# Patient Record
Sex: MALE | Race: WHITE | Employment: FULL TIME | URBAN - METROPOLITAN AREA
[De-identification: names, ages, dates, MRNs, and addresses within clinical notes are randomized per-mention and may not be internally consistent; named-entity substitution may affect disease eponyms.]

---

## 2017-09-19 LAB
ANION GAP SERPL CALCULATED.3IONS-SCNC: 6 MMOL/L (ref 4–12)
BUN BLDV-MCNC: 17 MG/DL (ref 7–20)
CALCIUM SERPL-MCNC: 8.7 MG/DL (ref 8.8–10.5)
CHLORIDE BLD-SCNC: 96 MEQ/L (ref 101–111)
CO2: 28 MEQ/L (ref 21–32)
CREAT SERPL-MCNC: 1.1 MG/DL (ref 0.7–1.3)
CREATININE CLEARANCE: >60
CREATININE, RANDOM URINE: 62.8 MG/DL
GLUCOSE: 136 MG/DL (ref 70–110)
PARATHYROID HORMONE INTACT: 30 U/ML (ref 12–88)
PHOSPHORUS: 3 MG/DL (ref 2.4–4.7)
POTASSIUM SERPL-SCNC: 4.4 MEQ/L (ref 3.6–5)
PROTEIN, URINE, RANDOM: < 6 MG/DL
PROTEIN/CREAT RATIO: < 0.1 G/1.73M2
SODIUM BLD-SCNC: 130 MEQ/L (ref 135–145)
VITAMIN D 25-HYDROXY: 41.14 NG/ML (ref 30–100)

## 2017-09-26 ENCOUNTER — OFFICE VISIT (OUTPATIENT)
Dept: NEPHROLOGY | Age: 67
End: 2017-09-26
Payer: COMMERCIAL

## 2017-09-26 VITALS
DIASTOLIC BLOOD PRESSURE: 84 MMHG | WEIGHT: 298.6 LBS | OXYGEN SATURATION: 97 % | HEART RATE: 70 BPM | SYSTOLIC BLOOD PRESSURE: 130 MMHG

## 2017-09-26 DIAGNOSIS — M54.5 CHRONIC MIDLINE LOW BACK PAIN, WITH SCIATICA PRESENCE UNSPECIFIED: ICD-10-CM

## 2017-09-26 DIAGNOSIS — N18.30 CKD (CHRONIC KIDNEY DISEASE) STAGE 3, GFR 30-59 ML/MIN (HCC): Primary | ICD-10-CM

## 2017-09-26 DIAGNOSIS — N18.3 TYPE 2 DIABETES MELLITUS WITH STAGE 3 CHRONIC KIDNEY DISEASE, UNSPECIFIED LONG TERM INSULIN USE STATUS: ICD-10-CM

## 2017-09-26 DIAGNOSIS — G89.29 CHRONIC MIDLINE LOW BACK PAIN, WITH SCIATICA PRESENCE UNSPECIFIED: ICD-10-CM

## 2017-09-26 DIAGNOSIS — R80.9 MICROALBUMINURIA: ICD-10-CM

## 2017-09-26 DIAGNOSIS — I10 ESSENTIAL HYPERTENSION, BENIGN: ICD-10-CM

## 2017-09-26 DIAGNOSIS — E11.22 TYPE 2 DIABETES MELLITUS WITH STAGE 3 CHRONIC KIDNEY DISEASE, UNSPECIFIED LONG TERM INSULIN USE STATUS: ICD-10-CM

## 2017-09-26 PROCEDURE — 99213 OFFICE O/P EST LOW 20 MIN: CPT | Performed by: INTERNAL MEDICINE

## 2017-09-26 RX ORDER — PIOGLITAZONEHYDROCHLORIDE 15 MG/1
15 TABLET ORAL DAILY
COMMUNITY
Start: 2017-06-28

## 2017-09-26 RX ORDER — AMLODIPINE BESYLATE 2.5 MG/1
2.5 TABLET ORAL DAILY
COMMUNITY
Start: 2017-06-28

## 2018-09-18 LAB
ANION GAP SERPL CALCULATED.3IONS-SCNC: 9 MMOL/L (ref 4–12)
BUN BLDV-MCNC: 18 MG/DL (ref 7–20)
CALCIUM SERPL-MCNC: 8.8 MG/DL (ref 8.8–10.5)
CHLORIDE BLD-SCNC: 98 MEQ/L (ref 101–111)
CO2: 28 MEQ/L (ref 21–32)
CREAT SERPL-MCNC: 1.09 MG/DL (ref 0.7–1.3)
CREATININE CLEARANCE: >60
CREATININE, RANDOM URINE: 91.2 MG/DL
GLUCOSE, FASTING: 201 MG/DL (ref 70–110)
MICROALBUMIN UR-MCNC: 9.2 MG/L
MICROALBUMIN/CREAT UR-RTO: 10.1 MG/GM (ref 0–30)
POTASSIUM SERPL-SCNC: 4.7 MEQ/L (ref 3.6–5)
PROTEIN, URINE, RANDOM: 6 MG/DL
PROTEIN/CREAT RATIO: 0.1 G/1.73M2
SODIUM BLD-SCNC: 135 MEQ/L (ref 135–145)

## 2018-09-25 ENCOUNTER — OFFICE VISIT (OUTPATIENT)
Dept: NEPHROLOGY | Age: 68
End: 2018-09-25
Payer: MEDICARE

## 2018-09-25 VITALS — WEIGHT: 291 LBS | HEART RATE: 68 BPM | SYSTOLIC BLOOD PRESSURE: 110 MMHG | DIASTOLIC BLOOD PRESSURE: 60 MMHG

## 2018-09-25 DIAGNOSIS — I10 ESSENTIAL HYPERTENSION, BENIGN: ICD-10-CM

## 2018-09-25 DIAGNOSIS — E11.22 TYPE 2 DIABETES MELLITUS WITH STAGE 2 CHRONIC KIDNEY DISEASE, UNSPECIFIED WHETHER LONG TERM INSULIN USE (HCC): ICD-10-CM

## 2018-09-25 DIAGNOSIS — N18.2 CKD (CHRONIC KIDNEY DISEASE), STAGE II: Primary | ICD-10-CM

## 2018-09-25 DIAGNOSIS — N18.2 TYPE 2 DIABETES MELLITUS WITH STAGE 2 CHRONIC KIDNEY DISEASE, UNSPECIFIED WHETHER LONG TERM INSULIN USE (HCC): ICD-10-CM

## 2018-09-25 PROBLEM — E11.9 TYPE 2 DIABETES MELLITUS (HCC): Status: ACTIVE | Noted: 2018-09-25

## 2018-09-25 PROCEDURE — 99213 OFFICE O/P EST LOW 20 MIN: CPT | Performed by: INTERNAL MEDICINE

## 2018-09-25 NOTE — PROGRESS NOTES
Renal Progress Note    Assessment and Plan:      Diagnosis Orders   1. CKD (chronic kidney disease), stage II     2. Essential hypertension, benign     3. Type 2 diabetes mellitus with stage 2 chronic kidney disease, unspecified whether long term insulin use (HCC)       PLAN:  Reviewed labs with the patient   Serum creatinine is 1.09 mg/dl and was 1.1 mg/dl 12 months ago  Medications reviewed   Refills provided for tramadol 270 tablets 50 mg TID as needed for pain  Return visit in 12 months     Patient Active Problem List   Diagnosis    Microalbuminuria    Type II or unspecified type diabetes mellitus without mention of complication, not stated as uncontrolled    Essential hypertension, benign    Edema    Proteinuria    CKD (chronic kidney disease) stage 3, GFR 30-59 ml/min    Back pain, chronic    Type 2 diabetes mellitus with diabetic chronic kidney disease (HCC)    CKD (chronic kidney disease), stage II    Type [facility] ([facility]       Subjective:   Chief complaint:  Chief Complaint   Patient presents with    1 Year Follow Up    Chronic Kidney Disease     Stage 3      HPI:This is a follow up visit for Mr. Wilfredo Langston who is here today for return appointment. I see him for chronic kidney disease. He was last seen about 12 months ago. Serum creatinine was 1.1 mg per deciliter. Today it is 1.09 mg/dL. Blood pressure is good. Still has chronic back pain  Well with no complaints.     ROS:Constitutional: negative  Eyes: negative  Ears, nose, mouth, throat, and face: negative  Respiratory: negative  Cardiovascular: negative  Gastrointestinal: negative  Genitourinary:negative  Integument/breast: negative  Hematologic/lymphatic: negative  Musculoskeletal:positive for arthralgias, back pain and stiff joints  Neurological: negative  Behavioral/Psych: negative  Endocrine: negative  Allergic/Immunologic: negative     Medications:     Current Outpatient Prescriptions   Medication Sig Dispense Refill   

## 2019-10-01 LAB
ANION GAP SERPL CALCULATED.3IONS-SCNC: 6 MMOL/L (ref 4–12)
BUN BLDV-MCNC: 15 MG/DL (ref 7–20)
CALCIUM SERPL-MCNC: 9.2 MG/DL (ref 8.8–10.5)
CHLORIDE BLD-SCNC: 102 MEQ/L (ref 101–111)
CO2: 29 MEQ/L (ref 21–32)
CREAT SERPL-MCNC: 1.21 MG/DL (ref 0.6–1.3)
CREATININE CLEARANCE: 60
GLUCOSE: 114 MG/DL (ref 70–110)
POTASSIUM SERPL-SCNC: 5 MEQ/L (ref 3.6–5)
SODIUM BLD-SCNC: 137 MEQ/L (ref 135–145)

## 2019-10-08 ENCOUNTER — OFFICE VISIT (OUTPATIENT)
Dept: NEPHROLOGY | Age: 69
End: 2019-10-08
Payer: MEDICARE

## 2019-10-08 VITALS
WEIGHT: 290 LBS | OXYGEN SATURATION: 95 % | SYSTOLIC BLOOD PRESSURE: 130 MMHG | HEART RATE: 59 BPM | DIASTOLIC BLOOD PRESSURE: 79 MMHG

## 2019-10-08 DIAGNOSIS — N18.2 CKD (CHRONIC KIDNEY DISEASE), STAGE II: Primary | ICD-10-CM

## 2019-10-08 DIAGNOSIS — I10 ESSENTIAL HYPERTENSION, BENIGN: ICD-10-CM

## 2019-10-08 DIAGNOSIS — G89.29 OTHER CHRONIC PAIN: ICD-10-CM

## 2019-10-08 DIAGNOSIS — E11.22 TYPE 2 DIABETES MELLITUS WITH STAGE 2 CHRONIC KIDNEY DISEASE, UNSPECIFIED WHETHER LONG TERM INSULIN USE (HCC): ICD-10-CM

## 2019-10-08 DIAGNOSIS — N18.2 TYPE 2 DIABETES MELLITUS WITH STAGE 2 CHRONIC KIDNEY DISEASE, UNSPECIFIED WHETHER LONG TERM INSULIN USE (HCC): ICD-10-CM

## 2019-10-08 DIAGNOSIS — R80.9 MICROALBUMINURIA: ICD-10-CM

## 2019-10-08 PROCEDURE — 99214 OFFICE O/P EST MOD 30 MIN: CPT | Performed by: INTERNAL MEDICINE

## 2019-10-08 PROCEDURE — 3046F HEMOGLOBIN A1C LEVEL >9.0%: CPT | Performed by: INTERNAL MEDICINE

## 2019-10-08 PROCEDURE — 3017F COLORECTAL CA SCREEN DOC REV: CPT | Performed by: INTERNAL MEDICINE

## 2019-10-08 PROCEDURE — 1036F TOBACCO NON-USER: CPT | Performed by: INTERNAL MEDICINE

## 2019-10-08 PROCEDURE — 1123F ACP DISCUSS/DSCN MKR DOCD: CPT | Performed by: INTERNAL MEDICINE

## 2019-10-08 PROCEDURE — G8427 DOCREV CUR MEDS BY ELIG CLIN: HCPCS | Performed by: INTERNAL MEDICINE

## 2019-10-08 PROCEDURE — G8421 BMI NOT CALCULATED: HCPCS | Performed by: INTERNAL MEDICINE

## 2019-10-08 PROCEDURE — 2022F DILAT RTA XM EVC RTNOPTHY: CPT | Performed by: INTERNAL MEDICINE

## 2019-10-08 PROCEDURE — G8482 FLU IMMUNIZE ORDER/ADMIN: HCPCS | Performed by: INTERNAL MEDICINE

## 2019-10-08 PROCEDURE — 4040F PNEUMOC VAC/ADMIN/RCVD: CPT | Performed by: INTERNAL MEDICINE

## 2019-10-08 RX ORDER — GLIMEPIRIDE 4 MG/1
2 TABLET ORAL
COMMUNITY
Start: 2019-09-27 | End: 2021-10-05

## 2019-10-08 RX ORDER — TRAZODONE HYDROCHLORIDE 150 MG/1
150 TABLET ORAL NIGHTLY
COMMUNITY

## 2019-10-08 RX ORDER — TRAMADOL HYDROCHLORIDE 50 MG/1
50 TABLET ORAL 3 TIMES DAILY PRN
Qty: 270 TABLET | Refills: 3 | Status: SHIPPED | OUTPATIENT
Start: 2019-10-08 | End: 2022-10-04

## 2019-10-08 RX ORDER — IPRATROPIUM BROMIDE 21 UG/1
SPRAY, METERED NASAL
COMMUNITY
Start: 2019-08-14

## 2019-10-22 DIAGNOSIS — M54.40 CHRONIC LOW BACK PAIN WITH SCIATICA, SCIATICA LATERALITY UNSPECIFIED, UNSPECIFIED BACK PAIN LATERALITY: Primary | ICD-10-CM

## 2019-10-22 DIAGNOSIS — G89.29 CHRONIC LOW BACK PAIN WITH SCIATICA, SCIATICA LATERALITY UNSPECIFIED, UNSPECIFIED BACK PAIN LATERALITY: Primary | ICD-10-CM

## 2019-10-22 RX ORDER — TRAMADOL HYDROCHLORIDE 50 MG/1
TABLET ORAL
Qty: 270 TABLET | Refills: 3 | Status: SHIPPED | OUTPATIENT
Start: 2019-10-22 | End: 2022-10-04 | Stop reason: SDUPTHER

## 2020-10-02 LAB
ANION GAP SERPL CALCULATED.3IONS-SCNC: 7 MMOL/L (ref 5–15)
BUN BLDV-MCNC: 15 MG/DL (ref 5–27)
CALCIUM SERPL-MCNC: 9.1 MG/DL (ref 8.5–10.5)
CHLORIDE BLD-SCNC: 103 MMOL/L (ref 98–109)
CO2: 30 MMOL/L (ref 22–32)
CREAT SERPL-MCNC: 1.38 MG/DL (ref 0.6–1.3)
EGFR AFRICAN AMERICAN: >60 ML/MIN/1.73SQ.M
EGFR IF NONAFRICAN AMERICAN: 51 ML/MIN/1.73SQ.M
GLUCOSE: 120 MG/DL (ref 65–99)
POTASSIUM SERPL-SCNC: 4.8 MMOL/L (ref 3.5–5)
SODIUM BLD-SCNC: 140 MMOL/L (ref 134–146)

## 2020-10-06 ENCOUNTER — OFFICE VISIT (OUTPATIENT)
Dept: NEPHROLOGY | Age: 70
End: 2020-10-06
Payer: MEDICARE

## 2020-10-06 VITALS
HEART RATE: 60 BPM | DIASTOLIC BLOOD PRESSURE: 77 MMHG | SYSTOLIC BLOOD PRESSURE: 143 MMHG | OXYGEN SATURATION: 99 % | WEIGHT: 297 LBS

## 2020-10-06 PROCEDURE — 1036F TOBACCO NON-USER: CPT | Performed by: INTERNAL MEDICINE

## 2020-10-06 PROCEDURE — 3046F HEMOGLOBIN A1C LEVEL >9.0%: CPT | Performed by: INTERNAL MEDICINE

## 2020-10-06 PROCEDURE — 99213 OFFICE O/P EST LOW 20 MIN: CPT | Performed by: INTERNAL MEDICINE

## 2020-10-06 PROCEDURE — 4040F PNEUMOC VAC/ADMIN/RCVD: CPT | Performed by: INTERNAL MEDICINE

## 2020-10-06 PROCEDURE — 1123F ACP DISCUSS/DSCN MKR DOCD: CPT | Performed by: INTERNAL MEDICINE

## 2020-10-06 PROCEDURE — 3017F COLORECTAL CA SCREEN DOC REV: CPT | Performed by: INTERNAL MEDICINE

## 2020-10-06 PROCEDURE — G8421 BMI NOT CALCULATED: HCPCS | Performed by: INTERNAL MEDICINE

## 2020-10-06 PROCEDURE — G8427 DOCREV CUR MEDS BY ELIG CLIN: HCPCS | Performed by: INTERNAL MEDICINE

## 2020-10-06 PROCEDURE — G8484 FLU IMMUNIZE NO ADMIN: HCPCS | Performed by: INTERNAL MEDICINE

## 2020-10-06 PROCEDURE — 2022F DILAT RTA XM EVC RTNOPTHY: CPT | Performed by: INTERNAL MEDICINE

## 2020-10-06 RX ORDER — INSULIN GLARGINE 100 [IU]/ML
25 INJECTION, SOLUTION SUBCUTANEOUS DAILY
COMMUNITY
Start: 2020-08-28

## 2020-10-06 RX ORDER — TURMERIC 400 MG
CAPSULE ORAL
COMMUNITY

## 2020-10-06 NOTE — PROGRESS NOTES
Renal Progress Note    Assessment and Plan:      Diagnosis Orders   1. CKD (chronic kidney disease), stage II     2. Chronic low back pain with sciatica, sciatica laterality unspecified, unspecified back pain laterality     3. Essential hypertension, benign     4. Type 2 diabetes mellitus with stage 2 chronic kidney disease, unspecified whether long term insulin use ([facility]     5. Microalbuminuria       PLAN:   Labs are discussed with the patient. He understood. Serum creatinine is increased to 1.38 mg/dL from 1.21 mg/dL. This is likely due to naproxen. Medications reviewed. No changes. Refills provided for tramadol 50 mg, 270 tablets 1 tablet TID As Needed for Pain with 3 Refills. Return visit in 12 months with labs. Patient Active Problem List   Diagnosis    Microalbuminuria    Type II or unspecified type diabetes mellitus without mention of complication, not stated as uncontrolled    Essential hypertension, benign    Edema    Proteinuria    CKD (chronic kidney disease) stage 3, GFR 30-59 ml/min    Back pain, chronic    Type 2 diabetes mellitus with diabetic chronic kidney disease ([facility]    CKD (chronic kidney disease), stage II    Type 2 diabetes mellitus ([facility]       Subjective:   Chief complaint:  Chief Complaint   Patient presents with    Chronic Kidney Disease     Stage III      HPI:This is a follow up visit for Mr. Coby Wyman who is here today for return appointment. I seen him for chronic kidney disease and microalbuminuria. Was last seen about 12 months ago. Serum creatinine was 1.2 mg/dL. Today it is 1.38 mg/dL. He denies any chest pain or shortness of breath. No fever or chills. No nausea vomiting. No headaches. No abdominal pain. Appetite is good. He continues to have  moderate to severe chronic low back pain due to disc disease inoperable. He ran out of tramadol and has been taking  naproxen. As a result  the serum creatinine went up.     ROS:Constitutional: negative  Eyes: negative  Ears, nose, mouth, throat, and face: negative  Respiratory: negative  Cardiovascular: negative  Gastrointestinal: negative  Genitourinary:negative  Integument/breast: negative  Hematologic/lymphatic: negative  Musculoskeletal:positive for arthralgias, back pain and stiff joints  Neurological: negative  Behavioral/Psych: negative  Endocrine: negative  Allergic/Immunologic: negative  Medications:     Current Outpatient Medications   Medication Sig Dispense Refill    LANTUS SOLOSTAR 100 UNIT/ML injection pen Inject 25 Units into the skin daily       Turmeric 400 MG CAPS Take by mouth      traZODone (DESYREL) 150 MG tablet Take 150 mg by mouth nightly      glimepiride (AMARYL) 4 MG tablet Take 2 mg by mouth every morning (before breakfast)       ipratropium (ATROVENT) 0.03 % nasal spray       Dulaglutide 1.5 MG/0.5ML SOPN Inject into the skin      metFORMIN (GLUCOPHAGE) 1000 MG tablet Take 1,000 mg by mouth every evening      amLODIPine (NORVASC) 2.5 MG tablet Take 2.5 mg by mouth daily      pioglitazone (ACTOS) 15 MG tablet Take 15 mg by mouth daily       busPIRone (BUSPAR) 15 MG tablet Take 15 mg by mouth 2 times daily       dapagliflozin (FARXIGA) 10 MG tablet Take 10 mg by mouth every morning       Lutein-Zeaxanthin 25-5 MG CAPS Take by mouth daily       Multiple Vitamins-Minerals (CENTRUM SILVER ADULT 50+ PO) Take by mouth Senior Multi-Vitamin      aspirin 325 MG tablet Take 325 mg by mouth daily.  metoprolol (LOPRESSOR) 50 MG tablet Take 50 mg by mouth 50 mg in am and 25 mg in pm      clopidogrel (PLAVIX) 75 MG tablet Take 75 mg by mouth daily.  enalapril (VASOTEC) 5 MG tablet Take 5 mg by mouth daily.  rosuvastatin (CRESTOR) 20 MG tablet Take 20 mg by mouth daily.  ezetimibe (ZETIA) 10 MG tablet Take 10 mg by mouth daily.       niacin 500 MG tablet Take 1,000 mg by mouth 2 times daily (with meals)       Omega-3 Fatty Acids (FISH OIL) 1000 MG CPDR Take 1,000 mg by mouth 2 times daily        No current facility-administered medications for this visit.         Lab Results:    CBC:   Lab Results   Component Value Date    WBC 6.1 11/06/2018    HGB 15.5 11/06/2018    HCT 46.8 11/06/2018    MCV 98.2 (H) 11/06/2018     11/06/2018     BMP:    Lab Results   Component Value Date     10/01/2020     10/01/2019     11/06/2018    K 4.8 10/01/2020    K 5.0 10/01/2019    K 4.2 11/06/2018     10/01/2020     10/01/2019    CL 99 (L) 11/06/2018    CO2 30 10/01/2020    CO2 29 10/01/2019    CO2 28 11/06/2018    BUN 15 10/01/2020    BUN 15 10/01/2019    BUN 15 11/06/2018    CREATININE 1.38 (H) 10/01/2020    CREATININE 1.21 10/01/2019    CREATININE 1.09 11/06/2018    GLUCOSE 120 (H) 10/01/2020    GLUCOSE 114 (H) 10/01/2019    GLUCOSE 108 11/06/2018      Hepatic:   Lab Results   Component Value Date    AST 34 11/06/2018    AST 25 06/09/2016    AST 21 12/23/2014    ALT 31 11/06/2018    ALT 22 06/09/2016    ALT 21 12/23/2014    BILITOT 0.4 11/06/2018    BILITOT 0.7 06/09/2016    BILITOT 0.6 12/23/2014    ALKPHOS 58 11/06/2018    ALKPHOS 47 06/09/2016    ALKPHOS 46 12/23/2014     BNP: No results found for: BNP  Lipids:   Lab Results   Component Value Date    CHOL 119 11/06/2018    HDL 47 11/06/2018     INR: No results found for: INR  URINE:   Lab Results   Component Value Date    PROTUR Negative 11/06/2018     Lab Results   Component Value Date    NITRU Negative 11/06/2018    COLORU Yellow 11/06/2018    WBCUA 0-5 11/06/2018    RBCUA None 11/06/2018    MUCUS Present 06/09/2016    LEUKOCYTESUR Negative 11/06/2018    UROBILINOGEN <2.0 E.U./dL 11/06/2018    BILIRUBINUR Negative 11/06/2018    GLUCOSEU >500 mg/dL 11/06/2018    KETUA Negative 11/06/2018      Microalbumen/Creatinine ratio:  No components found for: RUCREAT    Objective:   Vitals: BP (!) 143/77 (Site: Left Upper Arm, Position: Sitting, Cuff Size: Large Adult)   Pulse 60   Wt 297 lb (134.7 kg) SpO2 99%      Constitutional:  Alert, awake, no apparent distress  Skin:normal with no rash or any lesions  HEENT:Pupils are reactive . Throat is clear. Oral mucosa is moist.  Neck:supple with no thyromegaly or bruit   Cardiovascular:  S1, S2 without murmur   Respiratory:  Clear to auscultation with no wheezes or rales  Abdomen: +bowel sound, soft, non tender and no bruit  Ext: No LE edema  Musculoskeletal:Intact  Neuro:Alert, awake and oriented with no obvious focal deficit.   Speech is normal.    Electronically signed by Nidhi Rueda MD on 10/6/2020 at 10:01 AM

## 2020-11-03 PROBLEM — E11.9 TYPE 2 DIABETES MELLITUS (HCC): Status: RESOLVED | Noted: 2018-09-25 | Resolved: 2020-11-03

## 2021-09-29 LAB
ANION GAP SERPL CALCULATED.3IONS-SCNC: 9 MMOL/L (ref 5–15)
BUN BLDV-MCNC: 12 MG/DL (ref 5–27)
CALCIUM SERPL-MCNC: 9.1 MG/DL (ref 8.5–10.5)
CHLORIDE BLD-SCNC: 103 MMOL/L (ref 98–109)
CO2: 29 MMOL/L (ref 22–32)
CREAT SERPL-MCNC: 1.36 MG/DL (ref 0.6–1.3)
CREATINE, URINE: 105.41 MG/DL
EGFR AFRICAN AMERICAN: >60 ML/MIN/1.73SQ.M
EGFR IF NONAFRICAN AMERICAN: 52 ML/MIN/1.73SQ.M
GLUCOSE: 114 MG/DL (ref 65–99)
MICROALBUMIN/CREAT 24H UR: 0.8 MG/DL (ref 0–1.9)
MICROALBUMIN/CREAT UR-RTO: 7.6 MG/G CREAT (ref 0–30)
PARATHYROID HORMONE INTACT: 15 PG/ML (ref 12–88)
POTASSIUM SERPL-SCNC: 4.6 MMOL/L (ref 3.5–5)
SODIUM BLD-SCNC: 141 MMOL/L (ref 134–146)
VITAMIN D 25-HYDROXY: 52.2 NG/ML (ref 30–100)

## 2021-10-05 ENCOUNTER — OFFICE VISIT (OUTPATIENT)
Dept: NEPHROLOGY | Age: 71
End: 2021-10-05
Payer: MEDICARE

## 2021-10-05 VITALS
WEIGHT: 284 LBS | HEART RATE: 62 BPM | OXYGEN SATURATION: 96 % | DIASTOLIC BLOOD PRESSURE: 74 MMHG | SYSTOLIC BLOOD PRESSURE: 140 MMHG

## 2021-10-05 DIAGNOSIS — E11.22 TYPE 2 DIABETES MELLITUS WITH STAGE 2 CHRONIC KIDNEY DISEASE, UNSPECIFIED WHETHER LONG TERM INSULIN USE (HCC): ICD-10-CM

## 2021-10-05 DIAGNOSIS — G89.29 CHRONIC LOW BACK PAIN WITH SCIATICA, SCIATICA LATERALITY UNSPECIFIED, UNSPECIFIED BACK PAIN LATERALITY: ICD-10-CM

## 2021-10-05 DIAGNOSIS — M54.40 CHRONIC LOW BACK PAIN WITH SCIATICA, SCIATICA LATERALITY UNSPECIFIED, UNSPECIFIED BACK PAIN LATERALITY: ICD-10-CM

## 2021-10-05 DIAGNOSIS — N18.2 CKD (CHRONIC KIDNEY DISEASE), STAGE II: Primary | ICD-10-CM

## 2021-10-05 DIAGNOSIS — I10 ESSENTIAL HYPERTENSION, BENIGN: ICD-10-CM

## 2021-10-05 DIAGNOSIS — R80.9 MICROALBUMINURIA: ICD-10-CM

## 2021-10-05 DIAGNOSIS — N18.2 TYPE 2 DIABETES MELLITUS WITH STAGE 2 CHRONIC KIDNEY DISEASE, UNSPECIFIED WHETHER LONG TERM INSULIN USE (HCC): ICD-10-CM

## 2021-10-05 PROCEDURE — 1036F TOBACCO NON-USER: CPT | Performed by: INTERNAL MEDICINE

## 2021-10-05 PROCEDURE — 2022F DILAT RTA XM EVC RTNOPTHY: CPT | Performed by: INTERNAL MEDICINE

## 2021-10-05 PROCEDURE — G8427 DOCREV CUR MEDS BY ELIG CLIN: HCPCS | Performed by: INTERNAL MEDICINE

## 2021-10-05 PROCEDURE — G8484 FLU IMMUNIZE NO ADMIN: HCPCS | Performed by: INTERNAL MEDICINE

## 2021-10-05 PROCEDURE — 1123F ACP DISCUSS/DSCN MKR DOCD: CPT | Performed by: INTERNAL MEDICINE

## 2021-10-05 PROCEDURE — 4040F PNEUMOC VAC/ADMIN/RCVD: CPT | Performed by: INTERNAL MEDICINE

## 2021-10-05 PROCEDURE — 99213 OFFICE O/P EST LOW 20 MIN: CPT | Performed by: INTERNAL MEDICINE

## 2021-10-05 PROCEDURE — 3046F HEMOGLOBIN A1C LEVEL >9.0%: CPT | Performed by: INTERNAL MEDICINE

## 2021-10-05 PROCEDURE — 3017F COLORECTAL CA SCREEN DOC REV: CPT | Performed by: INTERNAL MEDICINE

## 2021-10-05 PROCEDURE — G8421 BMI NOT CALCULATED: HCPCS | Performed by: INTERNAL MEDICINE

## 2021-10-05 RX ORDER — TRAMADOL HYDROCHLORIDE 50 MG/1
50 TABLET ORAL 3 TIMES DAILY PRN
Qty: 90 TABLET | Refills: 3 | Status: SHIPPED | OUTPATIENT
Start: 2021-10-05 | End: 2022-10-04

## 2021-10-05 NOTE — PROGRESS NOTES
Renal Progress Note    Assessment and Plan:      Diagnosis Orders   1. CKD (chronic kidney disease), stage II     2. Essential hypertension, benign     3. Type 2 diabetes mellitus with stage 2 chronic kidney disease, unspecified whether long term insulin use ([facility]     4. Microalbuminuria     5. Chronic low back pain with sciatica, sciatica laterality unspecified, unspecified back pain laterality       PLAN:  Reviewed labs with the patient. None left foot. Addressed his questions. Serum creatinine is stable at 1.6 mg/dL. PTH is normal vitamin D level is normal    Medications reviewed  No changes. Refill for tramadol provided to him 50 mg 90 tablets with 1 tablet 3 times a day and 3 refills  Return visit in 12 months with labs    Patient Active Problem List   Diagnosis    Microalbuminuria    Type II or unspecified type diabetes mellitus without mention of complication, not stated as uncontrolled    Essential hypertension, benign    Edema    Proteinuria    CKD (chronic kidney disease) stage 3, GFR 30-59 ml/min (HCC)    Back pain, chronic    Type 2 diabetes mellitus with diabetic chronic kidney disease (HCC)    CKD (chronic kidney disease), stage II           Subjective:   Chief complaint:  Chief Complaint   Patient presents with    Chronic Kidney Disease     Stage II      HPI:This is a follow up visit for Mr Dayami West who is here today for return appointment. I seen him for chronic kidney disease stage II. He was last seen about 12 months ago. Doing well since then. No complaint. DEniees chest pain,nausea,dyspnea. Has chronic back pain modified by medications    ROS:  Pertinent positives stated above in HPI. All other systems were reviewed and were negative.   Medications:     Current Outpatient Medications   Medication Sig Dispense Refill    LANTUS SOLOSTAR 100 UNIT/ML injection pen Inject 25 Units into the skin daily       Turmeric 400 MG CAPS Take by mouth      traZODone (DESYREL) 150 MG tablet Take 150 mg by mouth nightly      ipratropium (ATROVENT) 0.03 % nasal spray       Dulaglutide 1.5 MG/0.5ML SOPN Inject 1.5 mg into the skin once a week       metFORMIN (GLUCOPHAGE) 1000 MG tablet Take 1,000 mg by mouth 2 times daily (with meals)       amLODIPine (NORVASC) 2.5 MG tablet Take 2.5 mg by mouth daily      pioglitazone (ACTOS) 15 MG tablet Take 15 mg by mouth daily       busPIRone (BUSPAR) 15 MG tablet Take 15 mg by mouth 2 times daily       dapagliflozin (FARXIGA) 10 MG tablet Take 10 mg by mouth every morning       Lutein-Zeaxanthin 25-5 MG CAPS Take by mouth daily       Multiple Vitamins-Minerals (CENTRUM SILVER ADULT 50+ PO) Take by mouth Senior Multi-Vitamin      aspirin 325 MG tablet Take 325 mg by mouth daily.  metoprolol (LOPRESSOR) 50 MG tablet Take 50 mg by mouth 50 mg in am and 25 mg in pm      clopidogrel (PLAVIX) 75 MG tablet Take 75 mg by mouth daily.  enalapril (VASOTEC) 5 MG tablet Take 5 mg by mouth daily.  rosuvastatin (CRESTOR) 20 MG tablet Take 20 mg by mouth daily.  ezetimibe (ZETIA) 10 MG tablet Take 10 mg by mouth daily.  niacin 500 MG tablet Take 1,000 mg by mouth 2 times daily (with meals)       Omega-3 Fatty Acids (FISH OIL) 1000 MG CPDR Take 1,000 mg by mouth 2 times daily        No current facility-administered medications for this visit.        Lab Results:    CBC:   Lab Results   Component Value Date    WBC 6.1 11/06/2018    HGB 15.5 11/06/2018    HCT 46.8 11/06/2018    MCV 98.2 (H) 11/06/2018     11/06/2018     BMP:    Lab Results   Component Value Date     09/28/2021     10/01/2020     10/01/2019    K 4.6 09/28/2021    K 4.8 10/01/2020    K 5.0 10/01/2019     09/28/2021     10/01/2020     10/01/2019    CO2 29 09/28/2021    CO2 30 10/01/2020    CO2 29 10/01/2019    BUN 12 09/28/2021    BUN 15 10/01/2020    BUN 15 10/01/2019    CREATININE 1.36 (H) 09/28/2021    CREATININE 1.38 (H) 10/01/2020 CREATININE 1.21 10/01/2019    GLUCOSE 114 (H) 09/28/2021    GLUCOSE 120 (H) 10/01/2020    GLUCOSE 114 (H) 10/01/2019      Hepatic:   Lab Results   Component Value Date    AST 34 11/06/2018    AST 25 06/09/2016    AST 21 12/23/2014    ALT 31 11/06/2018    ALT 22 06/09/2016    ALT 21 12/23/2014    BILITOT 0.4 11/06/2018    BILITOT 0.7 06/09/2016    BILITOT 0.6 12/23/2014    ALKPHOS 58 11/06/2018    ALKPHOS 47 06/09/2016    ALKPHOS 46 12/23/2014     BNP: No results found for: BNP  Lipids:   Lab Results   Component Value Date    CHOL 119 11/06/2018    HDL 47 11/06/2018     INR: No results found for: INR  URINE:   Lab Results   Component Value Date    PROTUR Negative 11/06/2018     Lab Results   Component Value Date    NITRU Negative 11/06/2018    COLORU Yellow 11/06/2018    WBCUA 0-5 11/06/2018    RBCUA None 11/06/2018    MUCUS Present 06/09/2016    LEUKOCYTESUR Negative 11/06/2018    UROBILINOGEN <2.0 E.U./dL 11/06/2018    BILIRUBINUR Negative 11/06/2018    GLUCOSEU >500 mg/dL 11/06/2018    KETUA Negative 11/06/2018      Microalbumen/Creatinine ratio:  No components found for: RUCREAT    Objective:   Vitals: BP (!) 140/74 (Site: Right Upper Arm, Position: Sitting, Cuff Size: Large Adult)   Pulse 62   Wt 284 lb (128.8 kg)   SpO2 96%      Constitutional:  Alert, awake, no apparent distress  Skin:normal with no rash or any lesions  HEENT:Pupils are reactive . Throat is clear. Oral mucosa is moist.  Neck:supple with no thyromegaly or bruit   Cardiovascular:  S1, S2 without murmur   Respiratory:  Clear to auscultation with no wheezes or rales  Abdomen: +bowel sound, soft, non tender and no bruit  Ext: No LE edema  Musculoskeletal:Intact  Neuro:Alert, awake and oriented with no obvious focal deficit. Speech is normal.    Electronically signed by Aryan Glynn MD on 10/5/2021 at 10:07 AM   **This report has been created using voice recognition software.  It maycontain minor  errors which are inherent in voice recognition technology. **

## 2021-10-25 ENCOUNTER — TELEPHONE (OUTPATIENT)
Dept: NEPHROLOGY | Age: 71
End: 2021-10-25

## 2021-10-26 NOTE — TELEPHONE ENCOUNTER
[facility]

## 2022-09-21 LAB
ANION GAP SERPL CALCULATED.3IONS-SCNC: 9 MEQ/L (ref 7–16)
BUN BLDV-MCNC: 12 MG/DL (ref 8–23)
CALCIUM SERPL-MCNC: 9.3 MG/DL (ref 8.5–10.5)
CHLORIDE BLD-SCNC: 103 MEQ/L (ref 95–107)
CO2: 29 MEQ/L (ref 19–31)
CREAT SERPL-MCNC: 1.16 MG/DL (ref 0.8–1.4)
EGFR IF NONAFRICAN AMERICAN: 68 ML/MIN/1.73
GLUCOSE: 105 MG/DL (ref 70–99)
POTASSIUM SERPL-SCNC: 4.8 MEQ/L (ref 3.5–5.4)
SODIUM BLD-SCNC: 141 MEQ/L (ref 133–146)

## 2022-10-04 ENCOUNTER — OFFICE VISIT (OUTPATIENT)
Dept: NEPHROLOGY | Age: 72
End: 2022-10-04
Payer: MEDICARE

## 2022-10-04 VITALS
SYSTOLIC BLOOD PRESSURE: 142 MMHG | WEIGHT: 276 LBS | HEART RATE: 56 BPM | DIASTOLIC BLOOD PRESSURE: 73 MMHG | OXYGEN SATURATION: 99 %

## 2022-10-04 DIAGNOSIS — E11.21 DIABETIC NEPHROPATHY ASSOCIATED WITH TYPE 2 DIABETES MELLITUS (HCC): ICD-10-CM

## 2022-10-04 DIAGNOSIS — N18.2 CKD (CHRONIC KIDNEY DISEASE), STAGE II: Primary | ICD-10-CM

## 2022-10-04 DIAGNOSIS — M54.40 CHRONIC LOW BACK PAIN WITH SCIATICA, SCIATICA LATERALITY UNSPECIFIED, UNSPECIFIED BACK PAIN LATERALITY: ICD-10-CM

## 2022-10-04 DIAGNOSIS — I10 PRIMARY HYPERTENSION: ICD-10-CM

## 2022-10-04 DIAGNOSIS — G89.29 CHRONIC LOW BACK PAIN WITH SCIATICA, SCIATICA LATERALITY UNSPECIFIED, UNSPECIFIED BACK PAIN LATERALITY: ICD-10-CM

## 2022-10-04 PROCEDURE — G8484 FLU IMMUNIZE NO ADMIN: HCPCS | Performed by: INTERNAL MEDICINE

## 2022-10-04 PROCEDURE — 99213 OFFICE O/P EST LOW 20 MIN: CPT | Performed by: INTERNAL MEDICINE

## 2022-10-04 PROCEDURE — 3017F COLORECTAL CA SCREEN DOC REV: CPT | Performed by: INTERNAL MEDICINE

## 2022-10-04 PROCEDURE — 2022F DILAT RTA XM EVC RTNOPTHY: CPT | Performed by: INTERNAL MEDICINE

## 2022-10-04 PROCEDURE — 3046F HEMOGLOBIN A1C LEVEL >9.0%: CPT | Performed by: INTERNAL MEDICINE

## 2022-10-04 PROCEDURE — G8421 BMI NOT CALCULATED: HCPCS | Performed by: INTERNAL MEDICINE

## 2022-10-04 PROCEDURE — 1036F TOBACCO NON-USER: CPT | Performed by: INTERNAL MEDICINE

## 2022-10-04 PROCEDURE — G8427 DOCREV CUR MEDS BY ELIG CLIN: HCPCS | Performed by: INTERNAL MEDICINE

## 2022-10-04 PROCEDURE — 1123F ACP DISCUSS/DSCN MKR DOCD: CPT | Performed by: INTERNAL MEDICINE

## 2022-10-04 RX ORDER — TRAMADOL HYDROCHLORIDE 50 MG/1
50 TABLET ORAL EVERY 8 HOURS PRN
Qty: 270 TABLET | Refills: 3 | Status: SHIPPED | OUTPATIENT
Start: 2022-10-04 | End: 2022-10-20

## 2022-10-04 RX ORDER — ASPIRIN 81 MG/1
81 TABLET ORAL DAILY
COMMUNITY

## 2022-10-04 RX ORDER — LEVOCETIRIZINE DIHYDROCHLORIDE 5 MG/1
5 TABLET, FILM COATED ORAL NIGHTLY
COMMUNITY

## 2022-10-04 RX ORDER — ORAL SEMAGLUTIDE 7 MG/1
TABLET ORAL
COMMUNITY
Start: 2022-08-29

## 2022-10-04 NOTE — PROGRESS NOTES
Renal Progress Note    Assessment and Plan:      Diagnosis Orders   1. CKD (chronic kidney disease), stage II        2. Primary hypertension        3. Diabetic nephropathy associated with type 2 diabetes mellitus ([facility]                  PLAN:  Lab results reviewed with the patient. He understood. He had no questions. Serum creatinine is improved to 1.16 mg per deciliter from 1.36 mg/dL 12 months ago  Medications reviewed  No changes  Return return 12 months with labs          Patient Active Problem List   Diagnosis    Microalbuminuria    Type II or unspecified type diabetes mellitus without mention of complication, not stated as uncontrolled    Essential hypertension, benign    Edema    Proteinuria    CKD (chronic kidney disease) stage 3, GFR 30-59 ml/min ([facility])    Back pain, chronic    Type 2 diabetes mellitus with diabetic chronic kidney disease (HCC)    CKD (chronic kidney disease), stage II           Subjective:   Chief complaint:  Chief Complaint   Patient presents with    Chronic Kidney Disease     Stage II      HPI:This is a follow up visit for Mr. Yvonne Warren here today for return appointment. I see him for chronic kidney disease. He was last seen in about 12 months ago. Doing well since then with no complaint. He is still having chronic back pain exacerbated by activity but relieved by tramadol. No chest pain or shortness of breath. No difficulties with urination. .    ROS:  Pertinent positives stated above in HPI. All other systems were reviewed and were negative.   Medications:     Current Outpatient Medications   Medication Sig Dispense Refill    RYBELSUS 7 MG TABS TAKE ONE TABLET BY MOUTH ONCE EVERY DAY      aspirin 81 MG EC tablet Take 81 mg by mouth daily      Misc Natural Products (GLUCOSAMINE CHOND CMP ADVANCED PO) Take by mouth      levocetirizine (XYZAL) 5 MG tablet Take 5 mg by mouth nightly      LANTUS SOLOSTAR 100 UNIT/ML injection pen Inject 25 Units into the skin daily CREATININE 1.38 (H) 10/01/2020    GLUCOSE 105 (H) 09/20/2022    GLUCOSE 114 (H) 09/28/2021    GLUCOSE 120 (H) 10/01/2020      Hepatic:   Lab Results   Component Value Date    AST 34 11/06/2018    AST 25 06/09/2016    AST 21 12/23/2014    ALT 31 11/06/2018    ALT 22 06/09/2016    ALT 21 12/23/2014    BILITOT 0.4 11/06/2018    BILITOT 0.7 06/09/2016    BILITOT 0.6 12/23/2014    ALKPHOS 58 11/06/2018    ALKPHOS 47 06/09/2016    ALKPHOS 46 12/23/2014     BNP: No results found for: BNP  Lipids:   Lab Results   Component Value Date    CHOL 119 11/06/2018    HDL 47 11/06/2018     INR: No results found for: INR  URINE:   Lab Results   Component Value Date/Time    PROTUR Negative 11/06/2018 01:58 PM     Lab Results   Component Value Date/Time    NITRU Negative 11/06/2018 01:58 PM    COLORU Yellow 11/06/2018 01:58 PM    WBCUA 0-5 11/06/2018 01:58 PM    RBCUA None 11/06/2018 01:58 PM    MUCUS Present 06/09/2016 08:49 AM    LEUKOCYTESUR Negative 11/06/2018 01:58 PM    UROBILINOGEN <2.0 E.U./dL 11/06/2018 01:58 PM    BILIRUBINUR Negative 11/06/2018 01:58 PM    GLUCOSEU >500 mg/dL 11/06/2018 01:58 PM    KETUA Negative 11/06/2018 01:58 PM      Microalbumen/Creatinine ratio:  No components found for: RUCREAT    Objective:   Vitals: BP (!) 142/73 (Site: Right Upper Arm, Position: Sitting, Cuff Size: Large Adult)   Pulse 56   Wt 276 lb (125.2 kg)   SpO2 99%      Constitutional:  Alert, awake, no apparent distress  Skin:normal with no rash or any significant lesions  HEENT:Pupils are reactive . Throat is clear. Oral mucosa is moist.  Neck:supple with no thyromegaly, JVD, lymphadenopathy or bruit   Cardiovascular: Regular sinus rhythm without murmur, rubs or gallops   Respiratory:  Clear to auscultation with no wheezes or rales  Abdomen: Good bowel sound, soft, non tender and no bruit  Ext: No LE edema  Musculoskeletal:Intact  Neuro:Alert, awake and oriented with no obvious focal deficit.   Speech is normal.    Electronically signed by Narendra Calvo MD on 10/4/2022 at 10:04 AM   **This report has been created using voice recognition software. It maycontain minor  errors which are inherent in voice recognition technology. **

## 2022-10-19 DIAGNOSIS — G89.29 CHRONIC LOW BACK PAIN WITH SCIATICA, SCIATICA LATERALITY UNSPECIFIED, UNSPECIFIED BACK PAIN LATERALITY: ICD-10-CM

## 2022-10-19 DIAGNOSIS — M54.40 CHRONIC LOW BACK PAIN WITH SCIATICA, SCIATICA LATERALITY UNSPECIFIED, UNSPECIFIED BACK PAIN LATERALITY: ICD-10-CM

## 2022-10-20 RX ORDER — TRAMADOL HYDROCHLORIDE 50 MG/1
50 TABLET ORAL EVERY 8 HOURS PRN
Qty: 90 TABLET | Refills: 3 | Status: SHIPPED | OUTPATIENT
Start: 2022-10-20 | End: 2023-10-15

## 2023-02-16 ENCOUNTER — OFFICE VISIT (OUTPATIENT)
Dept: NEUROLOGY | Age: 73
End: 2023-02-16
Payer: MEDICARE

## 2023-02-16 VITALS
SYSTOLIC BLOOD PRESSURE: 120 MMHG | OXYGEN SATURATION: 95 % | HEART RATE: 62 BPM | HEIGHT: 71 IN | DIASTOLIC BLOOD PRESSURE: 62 MMHG | BODY MASS INDEX: 37.94 KG/M2 | WEIGHT: 271 LBS

## 2023-02-16 DIAGNOSIS — H53.2 DOUBLE VISION: ICD-10-CM

## 2023-02-16 DIAGNOSIS — G70.00 OCULAR MYASTHENIA (HCC): Primary | ICD-10-CM

## 2023-02-16 PROCEDURE — 1036F TOBACCO NON-USER: CPT | Performed by: PSYCHIATRY & NEUROLOGY

## 2023-02-16 PROCEDURE — 1123F ACP DISCUSS/DSCN MKR DOCD: CPT | Performed by: PSYCHIATRY & NEUROLOGY

## 2023-02-16 PROCEDURE — G8484 FLU IMMUNIZE NO ADMIN: HCPCS | Performed by: PSYCHIATRY & NEUROLOGY

## 2023-02-16 PROCEDURE — 99205 OFFICE O/P NEW HI 60 MIN: CPT | Performed by: PSYCHIATRY & NEUROLOGY

## 2023-02-16 PROCEDURE — 3017F COLORECTAL CA SCREEN DOC REV: CPT | Performed by: PSYCHIATRY & NEUROLOGY

## 2023-02-16 PROCEDURE — G8417 CALC BMI ABV UP PARAM F/U: HCPCS | Performed by: PSYCHIATRY & NEUROLOGY

## 2023-02-16 PROCEDURE — 3074F SYST BP LT 130 MM HG: CPT | Performed by: PSYCHIATRY & NEUROLOGY

## 2023-02-16 PROCEDURE — 3078F DIAST BP <80 MM HG: CPT | Performed by: PSYCHIATRY & NEUROLOGY

## 2023-02-16 PROCEDURE — G8427 DOCREV CUR MEDS BY ELIG CLIN: HCPCS | Performed by: PSYCHIATRY & NEUROLOGY

## 2023-02-16 RX ORDER — PYRIDOSTIGMINE BROMIDE 60 MG/1
30 TABLET ORAL 3 TIMES DAILY
COMMUNITY

## 2023-02-16 RX ORDER — MYCOPHENOLATE MOFETIL 250 MG/1
CAPSULE ORAL
Qty: 60 CAPSULE | Refills: 3 | Status: SHIPPED | OUTPATIENT
Start: 2023-02-16

## 2023-02-16 NOTE — PATIENT INSTRUCTIONS
CT chest W/ contrast, screening for thymoma   Start Cellcept 250 mg daily for 2 weeks, then change to 250 mg two times a day thereafter  Continue with Mestinon at current dose  Report any new symptoms  Call with any new symptoms or concerns. Follow up in 6 weeks.

## 2023-02-18 ENCOUNTER — HOSPITAL ENCOUNTER (OUTPATIENT)
Dept: CT IMAGING | Age: 73
Discharge: HOME | End: 2023-02-18
Payer: MEDICARE

## 2023-02-18 DIAGNOSIS — H53.2 DOUBLE VISION: ICD-10-CM

## 2023-02-18 DIAGNOSIS — G70.00 OCULAR MYASTHENIA (HCC): ICD-10-CM

## 2023-02-18 LAB — POC CREATININE WHOLE BLOOD: 1.2 MG/DL (ref 0.5–1.2)

## 2023-02-18 PROCEDURE — 6360000004 HC RX CONTRAST MEDICATION: Performed by: NURSE PRACTITIONER

## 2023-02-18 PROCEDURE — 82565 ASSAY OF CREATININE: CPT

## 2023-02-18 PROCEDURE — 71260 CT THORAX DX C+: CPT

## 2023-02-18 RX ADMIN — IOPAMIDOL 80 ML: 755 INJECTION, SOLUTION INTRAVENOUS at 11:46

## 2023-02-20 ENCOUNTER — TELEPHONE (OUTPATIENT)
Dept: NEUROLOGY | Age: 73
End: 2023-02-20

## 2023-02-20 NOTE — TELEPHONE ENCOUNTER
----- Message from MABEL Staton CNP sent at 2/19/2023 10:44 AM EST -----  Please let patient know his CT chest did not show thymoma, however did show A few small nodules in the lower lobes less than 6 mm. Please ask him to follow up with his PCP to Consider follow-up CT in one year per Fleischner criteria.   Darline Griffin CNP

## 2023-02-28 NOTE — PROGRESS NOTES
Chief Complaint   Patient presents with    Consultation     NP consult myasthenia gravis       Mahesh Ko is a 71 y.o. male who presents today for evaluation of episodes of double vision since December 2022. He describes double vision as one image next to one another. His symptoms seemed to be better in the morning and get worse as the day goes on. He would watch television at night and have to manually hold up both eye lids to avoid the double vision. He was seen by ophthalmology who obtained lab work checking acetylcholine receptor Ab were positive for binding, blocking,  and modulating Ab. He was started on mestinon, he just started on this yesterday. No family history of similar symptoms. No shortness of breath, no trouble swallowing, no slurred speech, no muscle weakness. He denies chest pain. No shortness of breath, no neck pain. He is diabetic for the past 20 years, well controlled. No dysphagia. No fever. No rash. No weight loss. History provided by patient accompanied by his wife.        Past Medical History:   Diagnosis Date    Chronic kidney disease     Hyperlipidemia     Hypertension     Myasthenia gravis ([facility] [address]     Type II or unspecified type diabetes mellitus without mention of complication, not stated as uncontrolled        Patient Active Problem List   Diagnosis    Microalbuminuria    Type II or unspecified type diabetes mellitus without mention of complication, not stated as uncontrolled    Essential hypertension, benign    Edema    Proteinuria    CKD (chronic kidney disease) stage 3, GFR 30-59 ml/min ([facility])    Back pain, chronic    Type 2 diabetes mellitus with diabetic chronic kidney disease (HCC)    CKD (chronic kidney disease), stage II       Allergies   Allergen Reactions    Latex     Dyazide [Hydrochlorothiazide W-Triamterene]     Phenergan [Promethazine]        Current Outpatient Medications   Medication Sig Dispense Refill    pyridostigmine (MESTINON) 60 MG tablet Take 30 mg by mouth 3 times daily      traMADol (ULTRAM) 50 MG tablet Take 1 tablet by mouth every 8 hours as needed for Pain for up to 360 days. 90 tablet 3    RYBELSUS 7 MG TABS TAKE ONE TABLET BY MOUTH ONCE EVERY DAY      aspirin 81 MG EC tablet Take 81 mg by mouth daily      Misc Natural Products (GLUCOSAMINE CHOND CMP ADVANCED PO) Take by mouth      levocetirizine (XYZAL) 5 MG tablet Take 5 mg by mouth nightly      LANTUS SOLOSTAR 100 UNIT/ML injection pen Inject 25 Units into the skin daily       Turmeric 400 MG CAPS Take by mouth      traZODone (DESYREL) 150 MG tablet Take 150 mg by mouth nightly      ipratropium (ATROVENT) 0.03 % nasal spray       metFORMIN (GLUCOPHAGE) 1000 MG tablet Take 1,000 mg by mouth 2 times daily (with meals)       amLODIPine (NORVASC) 2.5 MG tablet Take 2.5 mg by mouth daily      pioglitazone (ACTOS) 15 MG tablet Take 15 mg by mouth daily       busPIRone (BUSPAR) 15 MG tablet Take 15 mg by mouth 2 times daily       dapagliflozin (FARXIGA) 10 MG tablet Take 10 mg by mouth every morning       Lutein-Zeaxanthin 25-5 MG CAPS Take by mouth daily       Multiple Vitamins-Minerals (CENTRUM SILVER ADULT 50+ PO) Take by mouth Senior Multi-Vitamin      metoprolol (LOPRESSOR) 50 MG tablet Take 50 mg by mouth daily      clopidogrel (PLAVIX) 75 MG tablet Take 75 mg by mouth daily. enalapril (VASOTEC) 5 MG tablet Take 5 mg by mouth daily. rosuvastatin (CRESTOR) 20 MG tablet Take 20 mg by mouth daily. ezetimibe (ZETIA) 10 MG tablet Take 10 mg by mouth daily. niacin 500 MG tablet Take 1,000 mg by mouth 2 times daily (with meals)       Omega-3 Fatty Acids (FISH OIL) 1000 MG CPDR Take 1,000 mg by mouth 2 times daily        No current facility-administered medications for this visit.        Social History     Socioeconomic History    Marital status:      Spouse name: None    Number of children: None    Years of education: None    Highest education level: None   Tobacco Use    Smoking status: Former     Packs/day: 0.50     Types: Cigarettes     Quit date: 1998     Years since quittin.3    Smokeless tobacco: Never   Vaping Use    Vaping Use: Never used   Substance and Sexual Activity    Alcohol use: Yes    Drug use: Never    Sexual activity: Not Currently       Family History   Problem Relation Age of Onset    Other Mother     Heart Disease Father     Heart Disease Sister          I reviewed the past medical history, allergies, medications, social history and family history. Review of Systems   All systems reviewed, and are all negative, except what is mentioned in HPI      Vitals:    23 1034   BP: 120/62   Site: Right Upper Arm   Position: Sitting   Cuff Size: Large Adult   Pulse: 62   SpO2: 95%   Weight: 271 lb (122.9 kg)   Height: 5' 11\" (1.803 m)       Physical Examination:  General appearance - alert, well appearing, and in no distress, oriented to person, place, and time and over weight  Mental status- Level of Alertness: awake  Orientation: person, place, time  Memory: normal  Fund of Knowledge: normal  Attention/Concentration: normal  Language: normal. Mood is normal.   Neck - supple, no significant adenopathy, carotids upstroke normal bilaterally. There is no axillary lymphadenopathy. There is no carotid bruit. No neck lymphadenopathy . No thyroid enlargement   Neurological -   Cranial [id number]-[id number]:.   Cranial nerve II: Normal   Cranial nerve III: Pupils: equal, round, reactive to light  Cranial nerves III, IV, VI: Extraocular Movements: intact   Cranial nerve V: Facial sensation: intact   Cranial nerve VII:Facial strength: intact   Cranial nerve VIII: Hearing: intact   Cranial nerve IX: Palate Elevation intact bilaterally  Cranial nerve XI: Shoulder shrug intact bilaterally  Cranial nerve XII: Tongue midline   neck supple without rigidity, there is no limitation of range of motion of the neck.   DTR's are  decreased distal and symmetric  Babinski sign negative   Motor exam is 5/5 in the upper and lower extremities. Normal muscle tone. There is no muscle atrophy. Sensory is intact for light touch   Coordination: finger to nose,  intact  Gait and station intact   Abnormal movement none, vibration reduced distally  Skin - warm, dry to touch, normal coloration, no rashes, no suspicious skin lesions  Superficial temporal artery pulses are normal.   There is no resting tremor, no pin rolling, no bradykinesia, no Hypohonia, normal blink rate. Musculoskeletal: Has no hand arthritis, no limitation of ROM in any of the four extremities. There is no leg edema. The Heart was regular in rate and rhythm. No heart murmur  Chest Clear, with  good effort. Abdomen soft, intact bowel sounds. We reviewed the patient records from referring provider and available information in the EHR       ASSESSMENT:      Diagnosis Orders   1. Ocular myasthenia ([facility] [address]        2. Double vision           This is a 73-year-old male with episodic double vision since December 2022. His symptoms are better in the morning progressively gotten worse as the day goes on. He denies shortness of breath, trouble swallowing, slurred speech, muscle weakness. He had lab work checking acetylcholine receptor antibodies that were positive. I reviewed the patient pertinent labs and records in the EHR and from other providers. His symptoms consistent with ocular myasthenia. The patient was counseled about his symptoms, condition and work up recommended, he was also counseled about medication options and side effects. We will arrange for him to undergo CT chest with contrast to screen for thymoma. We will start him on CellCept 250 mg daily for 2 weeks, then change to 250 mg two times a day thereafter. Counseled on the importance of reporting any new or worsening symptoms immediately. After detailed discussion with patient we agreed on the following plan.          Plan    CT chest W/ contrast, screening for thymoma   Start Cellcept 250 mg daily for 2 weeks, then change to 250 mg two times a day thereafter  Continue with Mestinon at current dose  Report any new symptoms  Call with any new symptoms or concerns. Follow up in 6 weeks.      Total time 65 min    Sharan Rogers MD

## 2023-03-23 DIAGNOSIS — G70.00 OCULAR MYASTHENIA (HCC): ICD-10-CM

## 2023-03-23 DIAGNOSIS — H53.2 DOUBLE VISION: ICD-10-CM

## 2023-03-23 RX ORDER — MYCOPHENOLATE MOFETIL 250 MG/1
CAPSULE ORAL
Qty: 60 CAPSULE | Refills: 3 | Status: SHIPPED | OUTPATIENT
Start: 2023-03-23

## 2023-03-31 ENCOUNTER — OFFICE VISIT (OUTPATIENT)
Dept: NEUROLOGY | Age: 73
End: 2023-03-31
Payer: MEDICARE

## 2023-03-31 VITALS
HEART RATE: 56 BPM | WEIGHT: 255 LBS | OXYGEN SATURATION: 99 % | BODY MASS INDEX: 35.7 KG/M2 | SYSTOLIC BLOOD PRESSURE: 115 MMHG | HEIGHT: 71 IN | DIASTOLIC BLOOD PRESSURE: 75 MMHG

## 2023-03-31 DIAGNOSIS — H53.2 DOUBLE VISION: ICD-10-CM

## 2023-03-31 DIAGNOSIS — G70.00 OCULAR MYASTHENIA (HCC): Primary | ICD-10-CM

## 2023-03-31 PROCEDURE — G8417 CALC BMI ABV UP PARAM F/U: HCPCS | Performed by: NURSE PRACTITIONER

## 2023-03-31 PROCEDURE — 99213 OFFICE O/P EST LOW 20 MIN: CPT | Performed by: NURSE PRACTITIONER

## 2023-03-31 PROCEDURE — G8427 DOCREV CUR MEDS BY ELIG CLIN: HCPCS | Performed by: NURSE PRACTITIONER

## 2023-03-31 PROCEDURE — 3078F DIAST BP <80 MM HG: CPT | Performed by: NURSE PRACTITIONER

## 2023-03-31 PROCEDURE — 1036F TOBACCO NON-USER: CPT | Performed by: NURSE PRACTITIONER

## 2023-03-31 PROCEDURE — 3074F SYST BP LT 130 MM HG: CPT | Performed by: NURSE PRACTITIONER

## 2023-03-31 PROCEDURE — 3017F COLORECTAL CA SCREEN DOC REV: CPT | Performed by: NURSE PRACTITIONER

## 2023-03-31 PROCEDURE — 1123F ACP DISCUSS/DSCN MKR DOCD: CPT | Performed by: NURSE PRACTITIONER

## 2023-03-31 PROCEDURE — G8484 FLU IMMUNIZE NO ADMIN: HCPCS | Performed by: NURSE PRACTITIONER

## 2023-03-31 RX ORDER — MYCOPHENOLATE MOFETIL 500 MG/1
500 TABLET ORAL 2 TIMES DAILY
Qty: 180 TABLET | Refills: 1 | Status: SHIPPED | OUTPATIENT
Start: 2023-03-31

## 2023-03-31 RX ORDER — PYRIDOSTIGMINE BROMIDE 60 MG/1
30 TABLET ORAL 3 TIMES DAILY
Qty: 180 TABLET | Refills: 0 | Status: SHIPPED | OUTPATIENT
Start: 2023-03-31

## 2023-03-31 NOTE — PROGRESS NOTES
trouble swallowing, slurred speech, muscle weakness. He is on Cellcept 250 mg two times a day, in addition to the Mestinon 30 mg three times a day. No side effects noted to the medication. CT chest done W/ constrast, showed no thymoma, but did show a few small nodules in the lower lobes less than 6 mm. We asked him to follow up with his PCP regarding CT chest findings. After detailed discussion with patient we agreed on the following plan. Plan:  Change Cellcept to 500 mg two times a day  Continue with Mestinon at current dose  CBC, HFP  Follow up with your PCP regarding CT chest W/ contrast results showing A few small nodules in the lower lobes less than 6 mm. Consider follow-up CT in one year per Fleischner criteria. Follow up in 2-3 months or sooner if needed. Call if any questions or concerns.     Total time 26 min    MABEL Lyle - CNP

## 2023-04-17 ENCOUNTER — TELEPHONE (OUTPATIENT)
Dept: NEUROLOGY | Age: 73
End: 2023-04-17

## 2023-04-17 NOTE — TELEPHONE ENCOUNTER
Pt is calling and states he took the medication as directed for 5-6 days and then his double vision came back. He went back to taking the original amount and everything came  back fine after about 3 days. He also states he will be getting his labs done this Wednesday.   Please advise pt at [phone]

## 2023-04-17 NOTE — TELEPHONE ENCOUNTER
Spoke with patient who stated he was taking Cellcept 500 mg twice a day since last appointment. He started having double vision, different than in the past, 5-6 days after the increase. Previously his double vision he would see objects side to side. This time he was seeing objects on top of each other. He decreased the Cellcept back to 250 mg twice a day on his own. His double vision went away after 3 days on this dose. He has not had any double vision for the past 1 week. He denies any other symptoms. He plans on getting his CBC and HFP on Wednesday. Please advise. Thank you.

## 2023-04-18 LAB
ABSOLUTE BASO #: 0.05 K/UL (ref 0–0.2)
ABSOLUTE EOS #: 0.14 K/UL (ref 0–0.5)
ABSOLUTE LYMPH #: 1.49 K/UL (ref 1–4)
ABSOLUTE MONO #: 0.61 K/UL (ref 0.2–1)
ABSOLUTE NEUT #: 3.15 K/UL (ref 1.5–7.5)
ALBUMIN SERPL-MCNC: 4.7 G/DL (ref 3.5–5.2)
ALK PHOSPHATASE: 51 U/L (ref 40–125)
ALT SERPL-CCNC: 23 U/L (ref 5–50)
AST SERPL-CCNC: 35 U/L (ref 9–50)
BASOPHILS RELATIVE PERCENT: 0.9 %
BILIRUB SERPL-MCNC: 0.4 MG/DL
BILIRUBIN DIRECT: <0.2 MG/DL (ref 0–0.3)
EOSINOPHILS RELATIVE PERCENT: 2.6 %
HCT VFR BLD CALC: 43.9 % (ref 40–51)
HEMOGLOBIN: 14.2 G/DL (ref 13.5–17)
LYMPHOCYTE %: 27.3 %
MCH RBC QN AUTO: 30.1 PG (ref 25–33)
MCHC RBC AUTO-ENTMCNC: 32.3 G/DL (ref 31–36)
MCV RBC AUTO: 93 FL (ref 80–99)
MONOCYTES # BLD: 11.2 %
NEUTROPHILS RELATIVE PERCENT: 57.6 %
PDW BLD-RTO: 13.1 % (ref 11.5–15)
PLATELETS: 229 K/UL (ref 130–400)
PMV BLD AUTO: 11.1 FL (ref 9.3–13)
RBC: 4.72 M/UL (ref 4.5–6.1)
TOTAL PROTEIN: 7 G/DL (ref 6.1–8.3)
WBC: 5.5 K/UL (ref 3.5–11)

## 2023-05-31 DIAGNOSIS — H53.2 DOUBLE VISION: Primary | ICD-10-CM

## 2023-05-31 DIAGNOSIS — G70.00 OCULAR MYASTHENIA (HCC): ICD-10-CM

## 2023-05-31 RX ORDER — PYRIDOSTIGMINE BROMIDE 60 MG/1
TABLET ORAL
Qty: 135 TABLET | Refills: 1 | Status: SHIPPED | OUTPATIENT
Start: 2023-05-31

## 2023-06-12 ENCOUNTER — OFFICE VISIT (OUTPATIENT)
Dept: NEUROLOGY | Age: 73
End: 2023-06-12
Payer: MEDICARE

## 2023-06-12 VITALS
WEIGHT: 248 LBS | SYSTOLIC BLOOD PRESSURE: 125 MMHG | OXYGEN SATURATION: 99 % | BODY MASS INDEX: 34.72 KG/M2 | HEART RATE: 55 BPM | DIASTOLIC BLOOD PRESSURE: 65 MMHG | HEIGHT: 71 IN

## 2023-06-12 DIAGNOSIS — G70.00 OCULAR MYASTHENIA (HCC): Primary | ICD-10-CM

## 2023-06-12 DIAGNOSIS — H53.2 DOUBLE VISION: ICD-10-CM

## 2023-06-12 PROCEDURE — G8427 DOCREV CUR MEDS BY ELIG CLIN: HCPCS | Performed by: PSYCHIATRY & NEUROLOGY

## 2023-06-12 PROCEDURE — 3017F COLORECTAL CA SCREEN DOC REV: CPT | Performed by: PSYCHIATRY & NEUROLOGY

## 2023-06-12 PROCEDURE — 1123F ACP DISCUSS/DSCN MKR DOCD: CPT | Performed by: PSYCHIATRY & NEUROLOGY

## 2023-06-12 PROCEDURE — 3078F DIAST BP <80 MM HG: CPT | Performed by: PSYCHIATRY & NEUROLOGY

## 2023-06-12 PROCEDURE — 3074F SYST BP LT 130 MM HG: CPT | Performed by: PSYCHIATRY & NEUROLOGY

## 2023-06-12 PROCEDURE — 1036F TOBACCO NON-USER: CPT | Performed by: PSYCHIATRY & NEUROLOGY

## 2023-06-12 PROCEDURE — G8417 CALC BMI ABV UP PARAM F/U: HCPCS | Performed by: PSYCHIATRY & NEUROLOGY

## 2023-06-12 PROCEDURE — 99213 OFFICE O/P EST LOW 20 MIN: CPT | Performed by: PSYCHIATRY & NEUROLOGY

## 2023-07-27 DIAGNOSIS — M54.40 CHRONIC LOW BACK PAIN WITH SCIATICA, SCIATICA LATERALITY UNSPECIFIED, UNSPECIFIED BACK PAIN LATERALITY: ICD-10-CM

## 2023-07-27 DIAGNOSIS — G89.29 CHRONIC LOW BACK PAIN WITH SCIATICA, SCIATICA LATERALITY UNSPECIFIED, UNSPECIFIED BACK PAIN LATERALITY: ICD-10-CM

## 2023-07-28 RX ORDER — TRAMADOL HYDROCHLORIDE 50 MG/1
TABLET ORAL
Qty: 270 TABLET | Refills: 3 | Status: SHIPPED | OUTPATIENT
Start: 2023-07-28 | End: 2024-07-28

## 2023-08-16 RX ORDER — MYCOPHENOLATE MOFETIL 500 MG/1
TABLET ORAL
Qty: 180 TABLET | Refills: 1 | Status: SHIPPED | OUTPATIENT
Start: 2023-08-16

## 2023-08-16 NOTE — TELEPHONE ENCOUNTER
Please approve or deny     Last Visit Date:  6/12/2023  Jen Fulton      Next Visit Date:    10/23/2023  Jen Fulton

## 2023-09-27 LAB
ANION GAP SERPL CALCULATED.3IONS-SCNC: 7 MEQ/L (ref 7–16)
BUN BLDV-MCNC: 7 MG/DL (ref 8–23)
CALCIUM SERPL-MCNC: 9.4 MG/DL (ref 8.5–10.5)
CHLORIDE BLD-SCNC: 106 MEQ/L (ref 95–107)
CO2: 28 MEQ/L (ref 19–31)
CREAT SERPL-MCNC: 1.16 MG/DL (ref 0.8–1.4)
CREATINE, URINE: 122.7 MG/DL
EGFR IF NONAFRICAN AMERICAN: 67 ML/MIN/1.73
GLUCOSE: 97 MG/DL (ref 70–99)
POTASSIUM SERPL-SCNC: 5.3 MEQ/L (ref 3.5–5.4)
PROTEIN, URINE: 9 MG/DL
PROTEIN/CREAT RATIO: 73 MG/G
SODIUM BLD-SCNC: 141 MEQ/L (ref 133–146)

## 2023-10-03 ENCOUNTER — OFFICE VISIT (OUTPATIENT)
Dept: NEPHROLOGY | Age: 73
End: 2023-10-03
Payer: MEDICARE

## 2023-10-03 VITALS
WEIGHT: 239 LBS | HEIGHT: 71 IN | OXYGEN SATURATION: 99 % | DIASTOLIC BLOOD PRESSURE: 69 MMHG | SYSTOLIC BLOOD PRESSURE: 121 MMHG | BODY MASS INDEX: 33.46 KG/M2 | HEART RATE: 62 BPM

## 2023-10-03 DIAGNOSIS — E11.21 DIABETIC NEPHROPATHY ASSOCIATED WITH TYPE 2 DIABETES MELLITUS (HCC): ICD-10-CM

## 2023-10-03 DIAGNOSIS — N18.2 CKD (CHRONIC KIDNEY DISEASE), STAGE II: Primary | ICD-10-CM

## 2023-10-03 DIAGNOSIS — I10 PRIMARY HYPERTENSION: ICD-10-CM

## 2023-10-03 PROCEDURE — G8484 FLU IMMUNIZE NO ADMIN: HCPCS | Performed by: INTERNAL MEDICINE

## 2023-10-03 PROCEDURE — G8417 CALC BMI ABV UP PARAM F/U: HCPCS | Performed by: INTERNAL MEDICINE

## 2023-10-03 PROCEDURE — 3074F SYST BP LT 130 MM HG: CPT | Performed by: INTERNAL MEDICINE

## 2023-10-03 PROCEDURE — G8427 DOCREV CUR MEDS BY ELIG CLIN: HCPCS | Performed by: INTERNAL MEDICINE

## 2023-10-03 PROCEDURE — 3046F HEMOGLOBIN A1C LEVEL >9.0%: CPT | Performed by: INTERNAL MEDICINE

## 2023-10-03 PROCEDURE — 3017F COLORECTAL CA SCREEN DOC REV: CPT | Performed by: INTERNAL MEDICINE

## 2023-10-03 PROCEDURE — 3078F DIAST BP <80 MM HG: CPT | Performed by: INTERNAL MEDICINE

## 2023-10-03 PROCEDURE — 1036F TOBACCO NON-USER: CPT | Performed by: INTERNAL MEDICINE

## 2023-10-03 PROCEDURE — 99213 OFFICE O/P EST LOW 20 MIN: CPT | Performed by: INTERNAL MEDICINE

## 2023-10-03 PROCEDURE — 2022F DILAT RTA XM EVC RTNOPTHY: CPT | Performed by: INTERNAL MEDICINE

## 2023-10-03 PROCEDURE — 1123F ACP DISCUSS/DSCN MKR DOCD: CPT | Performed by: INTERNAL MEDICINE

## 2023-10-23 ENCOUNTER — OFFICE VISIT (OUTPATIENT)
Dept: NEUROLOGY | Age: 73
End: 2023-10-23
Payer: MEDICARE

## 2023-10-23 VITALS
DIASTOLIC BLOOD PRESSURE: 61 MMHG | HEIGHT: 71 IN | WEIGHT: 238 LBS | SYSTOLIC BLOOD PRESSURE: 128 MMHG | OXYGEN SATURATION: 98 % | BODY MASS INDEX: 33.32 KG/M2 | HEART RATE: 64 BPM

## 2023-10-23 DIAGNOSIS — G70.00 OCULAR MYASTHENIA (HCC): Primary | ICD-10-CM

## 2023-10-23 DIAGNOSIS — H53.2 DOUBLE VISION: ICD-10-CM

## 2023-10-23 PROCEDURE — G8484 FLU IMMUNIZE NO ADMIN: HCPCS | Performed by: PSYCHIATRY & NEUROLOGY

## 2023-10-23 PROCEDURE — 3078F DIAST BP <80 MM HG: CPT | Performed by: PSYCHIATRY & NEUROLOGY

## 2023-10-23 PROCEDURE — 3074F SYST BP LT 130 MM HG: CPT | Performed by: PSYCHIATRY & NEUROLOGY

## 2023-10-23 PROCEDURE — G8417 CALC BMI ABV UP PARAM F/U: HCPCS | Performed by: PSYCHIATRY & NEUROLOGY

## 2023-10-23 PROCEDURE — 99213 OFFICE O/P EST LOW 20 MIN: CPT | Performed by: PSYCHIATRY & NEUROLOGY

## 2023-10-23 PROCEDURE — 3017F COLORECTAL CA SCREEN DOC REV: CPT | Performed by: PSYCHIATRY & NEUROLOGY

## 2023-10-23 PROCEDURE — G8427 DOCREV CUR MEDS BY ELIG CLIN: HCPCS | Performed by: PSYCHIATRY & NEUROLOGY

## 2023-10-23 PROCEDURE — 1123F ACP DISCUSS/DSCN MKR DOCD: CPT | Performed by: PSYCHIATRY & NEUROLOGY

## 2023-10-23 PROCEDURE — 1036F TOBACCO NON-USER: CPT | Performed by: PSYCHIATRY & NEUROLOGY

## 2023-10-23 RX ORDER — MYCOPHENOLATE MOFETIL 500 MG/1
500 TABLET ORAL 2 TIMES DAILY
Qty: 180 TABLET | Refills: 2 | Status: SHIPPED | OUTPATIENT
Start: 2023-10-23

## 2023-10-23 NOTE — PROGRESS NOTES
NEUROLOGY OUT PATIENT FOLLOW UP NOTE:  10/23/768604:37 PM    [address] [facility] is here for follow up for occular  myasthenia gravis, double vision. He is here to go over plan.       Allergies   Allergen Reactions    Latex        Current Outpatient Medications:     mycophenolate (CELLCEPT) 500 MG tablet, TAKE 1 TABLET TWICE DAILY, Disp: 180 tablet, Rfl: 1    traMADol (ULTRAM) 50 MG tablet, TAKE 1 TABLET EVERY 8 HOURS AS NEEDED FOR  PAIN, Disp: 270 tablet, Rfl: 3    pyridostigmine (MESTINON) 60 MG tablet, TAKE 1/2 TABLET THREE TIMES DAILY, Disp: 135 tablet, Rfl: 1    RYBELSUS 7 MG TABS, TAKE ONE TABLET BY MOUTH ONCE EVERY DAY, Disp: , Rfl:     aspirin 81 MG EC tablet, Take 1 tablet by mouth daily, Disp: , Rfl:     Misc Natural Products (GLUCOSAMINE CHOND CMP ADVANCED PO), Take by mouth, Disp: , Rfl:     levocetirizine (XYZAL) 5 MG tablet, Take 1 tablet by mouth nightly, Disp: , Rfl:     LANTUS SOLOSTAR 100 UNIT/ML injection pen, Inject 25 Units into the skin daily, Disp: , Rfl:     Turmeric 400 MG CAPS, Take by mouth, Disp: , Rfl:     traZODone (DESYREL) 150 MG tablet, Take 1 tablet by mouth nightly, Disp: , Rfl:     ipratropium (ATROVENT) 0.03 % nasal spray, , Disp: , Rfl:     metFORMIN (GLUCOPHAGE) 1000 MG tablet, Take 1 tablet by mouth 2 times daily (with meals), Disp: , Rfl:     amLODIPine (NORVASC) 2.5 MG tablet, Take 1 tablet by mouth daily, Disp: , Rfl:     pioglitazone (ACTOS) 15 MG tablet, Take 1 tablet by mouth daily, Disp: , Rfl:     busPIRone (BUSPAR) 15 MG tablet, Take 15 mg by mouth 2 times daily, Disp: , Rfl:     dapagliflozin (FARXIGA) 10 MG tablet, Take 1 tablet by mouth every morning, Disp: , Rfl:     Lutein-Zeaxanthin 25-5 MG CAPS, Take by mouth daily , Disp: , Rfl:     Multiple Vitamins-Minerals (CENTRUM SILVER ADULT 50+ PO), Take by mouth Senior Multi-Vitamin, Disp: , Rfl:     metoprolol (LOPRESSOR) 50 MG tablet, Take 1 tablet by mouth daily, Disp: , Rfl:     clopidogrel (PLAVIX) 75 MG tablet, Take

## 2023-10-23 NOTE — PATIENT INSTRUCTIONS
Change Cellcept to 500 mg two times a day  Continue with Mestinon at current dose. CBC and CMP ordered. Follow up in 4 months or sooner if needed. Call if any questions or concerns.

## 2023-12-27 DIAGNOSIS — H53.2 DOUBLE VISION: ICD-10-CM

## 2023-12-27 DIAGNOSIS — G70.00 OCULAR MYASTHENIA (HCC): ICD-10-CM

## 2023-12-27 RX ORDER — PYRIDOSTIGMINE BROMIDE 60 MG/1
TABLET ORAL
Qty: 120 TABLET | Refills: 3 | Status: SHIPPED | OUTPATIENT
Start: 2023-12-27

## 2023-12-27 NOTE — TELEPHONE ENCOUNTER
Patient has outstanding orders for CMP and CBC.     Please approve or deny     Last Visit Date:  10/23/2023  Merlin Garrison        Next Visit Date:    2/26/2024    Vivian Mg

## 2024-02-17 LAB
ABSOLUTE BASO #: 0.04 K/UL (ref 0–0.2)
ABSOLUTE EOS #: 0.13 K/UL (ref 0–0.5)
ABSOLUTE LYMPH #: 1.57 K/UL (ref 1–4)
ABSOLUTE MONO #: 0.6 K/UL (ref 0.2–1)
ABSOLUTE NEUT #: 2.43 K/UL (ref 1.5–7.5)
ALBUMIN SERPL-MCNC: 4.6 G/DL (ref 3.5–5.2)
ALK PHOSPHATASE: 44 U/L (ref 40–125)
ALT SERPL-CCNC: 18 U/L (ref 5–50)
ANION GAP SERPL CALCULATED.3IONS-SCNC: 7 MEQ/L (ref 7–16)
AST SERPL-CCNC: 30 U/L (ref 9–50)
BASOPHILS RELATIVE PERCENT: 0.8 %
BILIRUB SERPL-MCNC: 0.3 MG/DL
BUN BLDV-MCNC: 10 MG/DL (ref 8–23)
CALCIUM SERPL-MCNC: 9.9 MG/DL (ref 8.5–10.5)
CHLORIDE BLD-SCNC: 103 MEQ/L (ref 95–107)
CO2: 30 MEQ/L (ref 19–31)
CREAT SERPL-MCNC: 1.3 MG/DL (ref 0.8–1.4)
EGFR IF NONAFRICAN AMERICAN: 58 ML/MIN/1.73
EOSINOPHILS RELATIVE PERCENT: 2.7 %
GLUCOSE: 109 MG/DL (ref 70–99)
HCT VFR BLD CALC: 42.7 % (ref 40–51)
HEMOGLOBIN: 14 G/DL (ref 13.5–17)
LYMPHOCYTE %: 32.8 %
MCH RBC QN AUTO: 30.2 PG (ref 25–33)
MCHC RBC AUTO-ENTMCNC: 32.8 G/DL (ref 31–36)
MCV RBC AUTO: 92.2 FL (ref 80–99)
MONOCYTES # BLD: 12.6 %
NEUTROPHILS RELATIVE PERCENT: 50.9 %
PDW BLD-RTO: 12.9 % (ref 11.5–15)
PLATELETS: 220 K/UL (ref 130–400)
PMV BLD AUTO: 11.1 FL (ref 9.3–13)
POTASSIUM SERPL-SCNC: 5.1 MEQ/L (ref 3.5–5.4)
RBC: 4.63 M/UL (ref 4.5–6.1)
SODIUM BLD-SCNC: 140 MEQ/L (ref 133–146)
TOTAL PROTEIN: 6.7 G/DL (ref 6.1–8.3)
WBC: 4.8 K/UL (ref 3.5–11)

## 2024-02-26 ENCOUNTER — OFFICE VISIT (OUTPATIENT)
Dept: NEUROLOGY | Age: 74
End: 2024-02-26
Payer: MEDICARE

## 2024-02-26 VITALS
WEIGHT: 243 LBS | HEIGHT: 71 IN | DIASTOLIC BLOOD PRESSURE: 71 MMHG | BODY MASS INDEX: 34.02 KG/M2 | OXYGEN SATURATION: 97 % | SYSTOLIC BLOOD PRESSURE: 131 MMHG | HEART RATE: 63 BPM

## 2024-02-26 DIAGNOSIS — G70.00 OCULAR MYASTHENIA (HCC): ICD-10-CM

## 2024-02-26 DIAGNOSIS — H53.2 DOUBLE VISION: Primary | ICD-10-CM

## 2024-02-26 PROCEDURE — G8417 CALC BMI ABV UP PARAM F/U: HCPCS | Performed by: NURSE PRACTITIONER

## 2024-02-26 PROCEDURE — 3075F SYST BP GE 130 - 139MM HG: CPT | Performed by: NURSE PRACTITIONER

## 2024-02-26 PROCEDURE — 1123F ACP DISCUSS/DSCN MKR DOCD: CPT | Performed by: NURSE PRACTITIONER

## 2024-02-26 PROCEDURE — 99213 OFFICE O/P EST LOW 20 MIN: CPT | Performed by: NURSE PRACTITIONER

## 2024-02-26 PROCEDURE — 3017F COLORECTAL CA SCREEN DOC REV: CPT | Performed by: NURSE PRACTITIONER

## 2024-02-26 PROCEDURE — G8427 DOCREV CUR MEDS BY ELIG CLIN: HCPCS | Performed by: NURSE PRACTITIONER

## 2024-02-26 PROCEDURE — G8484 FLU IMMUNIZE NO ADMIN: HCPCS | Performed by: NURSE PRACTITIONER

## 2024-02-26 PROCEDURE — 1036F TOBACCO NON-USER: CPT | Performed by: NURSE PRACTITIONER

## 2024-02-26 PROCEDURE — 3078F DIAST BP <80 MM HG: CPT | Performed by: NURSE PRACTITIONER

## 2024-02-26 RX ORDER — PYRIDOSTIGMINE BROMIDE 30 MG/1
30 TABLET ORAL 3 TIMES DAILY
Qty: 270 TABLET | Refills: 3 | Status: SHIPPED | OUTPATIENT
Start: 2024-02-26

## 2024-02-26 NOTE — PATIENT INSTRUCTIONS
Continue with  Cellcept to 500 mg two times a day  Continue with Mestinon at current dose. Refills given  CBC and CMP ordered to be done 8/2024.   Follow up with your PCP regarding CT chest W/ contrast results showing A few small nodules in the lower lobes less than 6 mm. Was recommended to consider follow-up CT in one year per Fleischner criteria.  Follow up in 4 months or sooner if needed.  Call if any questions or concerns.

## 2024-02-26 NOTE — PROGRESS NOTES
mouth daily, Disp: , Rfl:     rosuvastatin (CRESTOR) 20 MG tablet, Take 1 tablet by mouth daily, Disp: , Rfl:     ezetimibe (ZETIA) 10 MG tablet, Take 1 tablet by mouth daily, Disp: , Rfl:     niacin 500 MG tablet, Take 2 tablets by mouth 2 times daily (with meals), Disp: , Rfl:     Omega-3 Fatty Acids (FISH OIL) 1000 MG CPDR, Take 1 capsule by mouth 2 times daily, Disp: , Rfl:     I reviewed the past medical history, allergies, medications, social history and family history.       PE:   Vitals:    02/26/24 1214   BP: 131/71   Site: Left Upper Arm   Position: Sitting   Cuff Size: Large Adult   Pulse: 63   SpO2: 97%   Weight: 110.2 kg (243 lb)   Height: 1.803 m (5' 11\")        General Appearance:  alert and cooperative  Gen: NAD, Language is Intact. Skin: no rash, lesion, dry  to touch. warm  Head: no rash, no icterus  Neck: There is no carotid bruits. The Neck is supple.    Neuro: CN 2-12 grossly intact with no focal deficits. Power 5/5 Throughout symmetric, Reflexes are  symmetric. Long tracts are reduced distally. Cerebellar exam is Intact. Sensory exam is intact to light touch. Left wrist in brace.  Gait is intact.  Musculoskeletal:  Has no hand arthritis, no limitation of ROM in any of the four extremities.  Lower extremities no edema        DATA:         Results for orders placed or performed in visit on 02/16/24   CBC   Result Value Ref Range    WBC 4.8 3.5 - 11.0 K/uL    RBC 4.63 4.50 - 6.10 M/uL    Hemoglobin 14.0 13.5 - 17.0 g/dL    Hematocrit 42.7 40.0 - 51.0 %    MCV 92.2 80.0 - 99.0 fL    MCH 30.2 25.0 - 33.0 pg    MCHC 32.8 31.0 - 36.0 g/dL    RDW 12.9 11.5 - 15.0 %    Neutrophils % 50.9 %    Lymphocyte % 32.8 %    Monocytes 12.6 %    Eosinophils % 2.7 %    Basophils % 0.8 %    Platelets 220 130 - 400 K/uL    Absolute Neut # 2.43 1.50 - 7.50 K/uL    Absolute Lymph # 1.57 1.00 - 4.00 K/uL    Absolute Mono # 0.60 0.20 - 1.00 K/uL    Absolute Eos # 0.13 0.00 - 0.50 K/uL    Absolute Baso # 0.04 0.00 - 0.20

## 2024-05-29 DIAGNOSIS — G89.29 CHRONIC LOW BACK PAIN WITH SCIATICA, SCIATICA LATERALITY UNSPECIFIED, UNSPECIFIED BACK PAIN LATERALITY: ICD-10-CM

## 2024-05-29 DIAGNOSIS — M54.40 CHRONIC LOW BACK PAIN WITH SCIATICA, SCIATICA LATERALITY UNSPECIFIED, UNSPECIFIED BACK PAIN LATERALITY: ICD-10-CM

## 2024-05-30 RX ORDER — TRAMADOL HYDROCHLORIDE 50 MG/1
50 TABLET ORAL EVERY 8 HOURS PRN
Qty: 90 TABLET | Refills: 0 | Status: SHIPPED | OUTPATIENT
Start: 2024-05-30 | End: 2024-06-29

## 2024-06-01 DIAGNOSIS — G70.00 OCULAR MYASTHENIA (HCC): ICD-10-CM

## 2024-06-01 DIAGNOSIS — H53.2 DOUBLE VISION: ICD-10-CM

## 2024-06-03 RX ORDER — MYCOPHENOLATE MOFETIL 500 MG/1
500 TABLET ORAL 2 TIMES DAILY
Qty: 180 TABLET | Refills: 3 | Status: SHIPPED | OUTPATIENT
Start: 2024-06-03

## 2024-06-03 NOTE — TELEPHONE ENCOUNTER
Requested Prescriptions     Pending Prescriptions Disp Refills    mycophenolate (CELLCEPT) 500 MG tablet [Pharmacy Med Name: MYCOPHENOLATE MOFETIL 500 MG Tablet] 180 tablet 3     Sig: TAKE 1 TABLET TWICE DAILY      Please approve or deny     Last Visit Date:  2/26/2024       Next Visit Date:    8/26/2024

## 2024-09-23 LAB
BUN BLDV-MCNC: 7 MG/DL
CALCIUM SERPL-MCNC: 9.2 MG/DL
CHLORIDE BLD-SCNC: 103 MMOL/L
CO2: 25 MMOL/L
CREAT SERPL-MCNC: 1.17 MG/DL
EGFR: 66
GLUCOSE BLD-MCNC: 112 MG/DL
POTASSIUM SERPL-SCNC: 4.9 MMOL/L
PTH INTACT: 21
SODIUM BLD-SCNC: 140 MMOL/L

## 2024-09-24 LAB
BASOPHILS ABSOLUTE: 0.04 K/UL (ref 0–0.2)
BASOPHILS RELATIVE PERCENT: 1.1 % (ref 0–2)
EOSINOPHILS ABSOLUTE: 0.19 K/UL (ref 0–0.8)
EOSINOPHILS RELATIVE PERCENT: 5.1 % (ref 0–5)
HCT VFR BLD CALC: 41.5 % (ref 39–52)
HEMOGLOBIN: 13.8 G/DL (ref 13–18)
IMMATURE GRANS (ABS): 0.01 K/UL (ref 0–0.06)
IMMATURE GRANULOCYTES %: 0.3 % (ref 0–2)
LYMPHOCYTES ABSOLUTE: 1.49 K/UL (ref 0.9–5.2)
LYMPHOCYTES RELATIVE PERCENT: 39.6 % (ref 20–45)
MCH RBC QN AUTO: 30.9 PG (ref 26–32)
MCHC RBC AUTO-ENTMCNC: 33.3 G/DL (ref 32–35)
MCV RBC AUTO: 93 FL (ref 75–100)
MONOCYTES ABSOLUTE: 0.65 K/UL (ref 0.1–1)
MONOCYTES RELATIVE PERCENT: 17.3 % (ref 0–13)
NEUTROPHILS ABSOLUTE: 1.38 K/UL (ref 1.9–8)
NEUTROPHILS RELATIVE PERCENT: 36.6 % (ref 45–75)
PDW BLD-RTO: 12.5 % (ref 11.2–14.8)
PLATELET # BLD: 231 THOUS/CMM (ref 140–440)
PTH INTACT: 21 PG/ML (ref 12–65)
RBC # BLD: 4.46 MILL/CMM (ref 4.4–6.1)
VITAMIN D 25-HYDROXY: 46.3 NG/ML (ref 30–100)
WBC # BLD: 3.8 THDS/CMM (ref 3.6–11)

## 2024-09-25 LAB
CREATINE, URINE: 81.3 MG/DL
PROTEIN, URINE: 7.2 MG/DL
PROTEIN/CREAT,24 HR URINE: 0.09 MG/G

## 2024-09-27 LAB
ALBUMIN: 4.1 G/DL (ref 3.5–5.2)
ALK PHOSPHATASE: 47 U/L (ref 39–118)
ALT SERPL-CCNC: 22 U/L (ref 5–41)
ANION GAP SERPL CALCULATED.3IONS-SCNC: 9 MMOL/L (ref 7–16)
AST SERPL-CCNC: 32 U/L (ref 9–50)
BILIRUB SERPL-MCNC: 0.2 MG/DL
BUN BLDV-MCNC: 6 MG/DL (ref 8–23)
BUN BLDV-MCNC: 7 MG/DL (ref 8–23)
CALCIUM SERPL-MCNC: 9.2 MG/DL (ref 8.6–10.5)
CALCIUM SERPL-MCNC: 9.5 MG/DL (ref 8.6–10.5)
CHLORIDE BLD-SCNC: 103 MMOL/L (ref 96–107)
CHLORIDE BLD-SCNC: 104 MMOL/L (ref 96–107)
CO2: 25 MMOL/L (ref 18–32)
CO2: 27 MMOL/L (ref 18–32)
CREAT SERPL-MCNC: 1.13 MG/DL (ref 0.67–1.3)
CREAT SERPL-MCNC: 1.17 MG/DL (ref 0.67–1.3)
EGFR IF NONAFRICAN AMERICAN: 66 ML/MIN/1.73M2
EGFR IF NONAFRICAN AMERICAN: 69 ML/MIN/1.73M2
GLUCOSE: 112 MG/DL (ref 70–100)
GLUCOSE: 115 MG/DL (ref 70–100)
POTASSIUM SERPL-SCNC: 4.9 MMOL/L (ref 3.5–5.4)
POTASSIUM SERPL-SCNC: 4.9 MMOL/L (ref 3.5–5.4)
SODIUM BLD-SCNC: 140 MMOL/L (ref 135–148)
SODIUM BLD-SCNC: 140 MMOL/L (ref 135–148)
TOTAL PROTEIN: 6.3 G/DL (ref 6–8.3)

## 2024-09-30 ENCOUNTER — OFFICE VISIT (OUTPATIENT)
Dept: NEUROLOGY | Age: 74
End: 2024-09-30
Payer: MEDICARE

## 2024-09-30 ENCOUNTER — TELEPHONE (OUTPATIENT)
Dept: NEUROLOGY | Age: 74
End: 2024-09-30

## 2024-09-30 VITALS
BODY MASS INDEX: 32.62 KG/M2 | HEART RATE: 79 BPM | HEIGHT: 71 IN | OXYGEN SATURATION: 100 % | DIASTOLIC BLOOD PRESSURE: 64 MMHG | SYSTOLIC BLOOD PRESSURE: 118 MMHG | WEIGHT: 233 LBS

## 2024-09-30 DIAGNOSIS — G70.00 OCULAR MYASTHENIA (HCC): Primary | ICD-10-CM

## 2024-09-30 DIAGNOSIS — H53.2 DOUBLE VISION: ICD-10-CM

## 2024-09-30 PROCEDURE — 99213 OFFICE O/P EST LOW 20 MIN: CPT | Performed by: PSYCHIATRY & NEUROLOGY

## 2024-09-30 PROCEDURE — 3078F DIAST BP <80 MM HG: CPT | Performed by: PSYCHIATRY & NEUROLOGY

## 2024-09-30 PROCEDURE — G8427 DOCREV CUR MEDS BY ELIG CLIN: HCPCS | Performed by: PSYCHIATRY & NEUROLOGY

## 2024-09-30 PROCEDURE — 3074F SYST BP LT 130 MM HG: CPT | Performed by: PSYCHIATRY & NEUROLOGY

## 2024-09-30 PROCEDURE — 3017F COLORECTAL CA SCREEN DOC REV: CPT | Performed by: PSYCHIATRY & NEUROLOGY

## 2024-09-30 PROCEDURE — 1036F TOBACCO NON-USER: CPT | Performed by: PSYCHIATRY & NEUROLOGY

## 2024-09-30 PROCEDURE — G8417 CALC BMI ABV UP PARAM F/U: HCPCS | Performed by: PSYCHIATRY & NEUROLOGY

## 2024-09-30 PROCEDURE — 1123F ACP DISCUSS/DSCN MKR DOCD: CPT | Performed by: PSYCHIATRY & NEUROLOGY

## 2024-09-30 NOTE — PROGRESS NOTES
Final    Neutrophils Absolute 09/23/2024 1.38 (L)  1.9 - 8.0 K/uL Final    Lymphocytes Absolute 09/23/2024 1.49  0.9 - 5.2 K/uL Final    Monocytes Absolute 09/23/2024 0.65  0.1 - 1.0 K/uL Final    Eosinophils Absolute 09/23/2024 0.19  0.0 - 0.80 K/uL Final    Basophils Absolute 09/23/2024 0.04  0.0 - 0.2 K/uL Final    Immature Grans (Abs) 09/23/2024 0.01  0.00 - 0.06 K/uL Final    Comment: UNLESS OTHERWISE INDICATED, ALL TESTING PERFORMED AT:  "[facility] [place]. [address] [place] [id number]  : LARA GUEVARA M.D.  CLIA NUMBER [id number]  CAP ACCREDITATION AUID [id number]  Changes in testing location may be associated with reference range changes  for a number of analytes.  Please review reference intervals carefully.                Assessment:     Diagnosis Orders   1. Ocular myasthenia (HCC)        2. Double vision             Follow up for occular myasthenia gravis, double vision. He is doing well. He feels his symptoms are well controlled. He has intact power and coordination. He is using mestinon 30 mg  three times a day. He is also on cellcept 500 mg two times a day, no side effects noted. He can have occasional once a month diplopia at night time, does not recall the last time it happened. He did have lab work done checking CBC, CMP 9/23/2024 were within acceptable range. He followed with his PCP Tatiana Hawthorne at [facility] for the CT chest findings.  After a discussion with patient  we agreed on the following plan.         Plan:  Continue with  Cellcept 500 mg two times a day  Continue with Mestinon at current dose.  CBC and CMP ordered to be done 4/2025.   Follow up in 6 months or sooner if needed.  Call if any questions or concerns.        Total time 25 min    Adriel Iglesias MD

## 2024-09-30 NOTE — PATIENT INSTRUCTIONS
Continue with  Cellcept 500 mg two times a day  Continue with Mestinon at current dose.  CBC and CMP ordered to be done 4/2025.   Follow up in 6 months or sooner if needed.  Call if any questions or concerns.

## 2024-09-30 NOTE — TELEPHONE ENCOUNTER
----- Message from Dr. Adriel Iglesias MD sent at 9/29/2024  6:39 PM EDT -----  Please let patient Know BMP and hepatic panel showed no concerning findings.   Adriel Iglesias MD

## 2024-10-08 ENCOUNTER — OFFICE VISIT (OUTPATIENT)
Dept: NEPHROLOGY | Age: 74
End: 2024-10-08
Payer: MEDICARE

## 2024-10-08 VITALS
HEART RATE: 66 BPM | HEIGHT: 71 IN | BODY MASS INDEX: 32.34 KG/M2 | SYSTOLIC BLOOD PRESSURE: 122 MMHG | OXYGEN SATURATION: 99 % | WEIGHT: 231 LBS | DIASTOLIC BLOOD PRESSURE: 74 MMHG

## 2024-10-08 DIAGNOSIS — I10 PRIMARY HYPERTENSION: ICD-10-CM

## 2024-10-08 DIAGNOSIS — N18.2 CKD (CHRONIC KIDNEY DISEASE), STAGE II: Primary | ICD-10-CM

## 2024-10-08 PROCEDURE — 1036F TOBACCO NON-USER: CPT | Performed by: INTERNAL MEDICINE

## 2024-10-08 PROCEDURE — 1123F ACP DISCUSS/DSCN MKR DOCD: CPT | Performed by: INTERNAL MEDICINE

## 2024-10-08 PROCEDURE — 99213 OFFICE O/P EST LOW 20 MIN: CPT | Performed by: INTERNAL MEDICINE

## 2024-10-08 PROCEDURE — 3074F SYST BP LT 130 MM HG: CPT | Performed by: INTERNAL MEDICINE

## 2024-10-08 PROCEDURE — 3078F DIAST BP <80 MM HG: CPT | Performed by: INTERNAL MEDICINE

## 2024-10-08 PROCEDURE — G8427 DOCREV CUR MEDS BY ELIG CLIN: HCPCS | Performed by: INTERNAL MEDICINE

## 2024-10-08 PROCEDURE — G8417 CALC BMI ABV UP PARAM F/U: HCPCS | Performed by: INTERNAL MEDICINE

## 2024-10-08 PROCEDURE — 3017F COLORECTAL CA SCREEN DOC REV: CPT | Performed by: INTERNAL MEDICINE

## 2024-10-08 PROCEDURE — G8484 FLU IMMUNIZE NO ADMIN: HCPCS | Performed by: INTERNAL MEDICINE

## 2024-10-08 RX ORDER — METOPROLOL SUCCINATE 50 MG/1
TABLET, EXTENDED RELEASE ORAL
COMMUNITY
Start: 2024-08-04

## 2024-10-08 NOTE — PROGRESS NOTES
Renal Progress Note    Assessment and Plan:      Diagnosis Orders   1. CKD (chronic kidney disease), stage II        2. Primary hypertension                  PLAN:  Serum creatinine is good at 1.17 mg/dL.  Hypertension is well-controlled  Medications reviewed  No changes  Return visit as needed          Patient Active Problem List   Diagnosis    Microalbuminuria    Type II or unspecified type diabetes mellitus without mention of complication, not stated as uncontrolled    Essential hypertension, benign    Edema    Proteinuria    CKD (chronic kidney disease) stage 3, GFR 30-59 ml/min ([facility])    Back pain, chronic    Type 2 diabetes mellitus with diabetic chronic kidney disease (HCC)    CKD (chronic kidney disease), stage II           Subjective:   Chief complaint:  Chief Complaint   Patient presents with    Follow-up     FU for CKD 2      HPI:This is a follow up visit for Mr. Jose Sherman here today for return appointment.  I see him for chronic kidney disease.  He was last seen about 12 months ago.  Doing well with no complaint.  Appetite is good.  No nausea or vomiting.  No chest pain or shortness of breath.  Urinates well.    ROS:  Pertinent positives stated above in HPI. All other systems were reviewed and were negative.  Medications:     Current Outpatient Medications   Medication Sig Dispense Refill    metoprolol succinate (TOPROL XL) 50 MG extended release tablet       mycophenolate (CELLCEPT) 500 MG tablet TAKE 1 TABLET TWICE DAILY 180 tablet 3    traMADol (ULTRAM) 50 MG tablet Take 1 tablet by mouth every 8 hours as needed for Pain for up to 30 days. Max Daily Amount: 150 mg (Patient taking differently: Take 1 tablet by mouth every 8 hours as needed for Pain. Pt takes once a day (evenings)) 90 tablet 0    pyRIDostigmine (MESTINON) 30 MG tablet Take 1 tablet by mouth 3 times daily 270 tablet 3    RYBELSUS 7 MG TABS TAKE ONE TABLET BY MOUTH ONCE EVERY DAY      aspirin 81 MG EC tablet Take 1 tablet by mouth

## 2024-12-03 RX ORDER — PYRIDOSTIGMINE BROMIDE 30 MG/1
30 TABLET ORAL 3 TIMES DAILY
Qty: 270 TABLET | Refills: 3 | Status: SHIPPED | OUTPATIENT
Start: 2024-12-03

## 2024-12-03 NOTE — TELEPHONE ENCOUNTER
Jose Sherman called requesting a refill on the following medications:  Requested Prescriptions     Pending Prescriptions Disp Refills    pyRIDostigmine (MESTINON) 30 MG tablet [Pharmacy Med Name: pyRIDostigmine Bromide Oral Tablet 30 MG] 252 tablet 3     Sig: TAKE 1 TABLET THREE TIMES DAILY       Date of last visit: 9/30/2024  Date of next visit (if applicable):3/31/2025  Date of last refill: 2/26/24  Pharmacy Name: Evelia Davis LPN

## 2025-03-21 ENCOUNTER — RESULTS FOLLOW-UP (OUTPATIENT)
Dept: NEUROLOGY | Age: 75
End: 2025-03-21

## 2025-03-21 LAB
ALBUMIN: 4.4 G/DL (ref 3.5–5.2)
ALK PHOSPHATASE: 43 U/L (ref 39–118)
ALT SERPL-CCNC: 20 U/L (ref 5–41)
ANION GAP SERPL CALCULATED.3IONS-SCNC: 10 MMOL/L (ref 7–16)
AST SERPL-CCNC: 35 U/L (ref 9–50)
BASOPHILS ABSOLUTE: 0.04 K/UL (ref 0–0.2)
BASOPHILS RELATIVE PERCENT: 0.9 % (ref 0–2)
BILIRUB SERPL-MCNC: 0.3 MG/DL
BUN BLDV-MCNC: 10 MG/DL (ref 8–23)
CALCIUM SERPL-MCNC: 9.5 MG/DL (ref 8.6–10.5)
CHLORIDE BLD-SCNC: 100 MMOL/L (ref 96–107)
CO2: 27 MMOL/L (ref 18–32)
CREAT SERPL-MCNC: 1.22 MG/DL (ref 0.67–1.3)
EGFR IF NONAFRICAN AMERICAN: 63 ML/MIN/1.73M2
EOSINOPHILS ABSOLUTE: 0.16 K/UL (ref 0–0.8)
EOSINOPHILS RELATIVE PERCENT: 3.7 % (ref 0–5)
GLUCOSE: 80 MG/DL (ref 70–100)
HCT VFR BLD CALC: 41.9 % (ref 39–52)
HEMOGLOBIN: 13.7 G/DL (ref 13–18)
IMMATURE GRANS (ABS): 0.05 K/UL (ref 0–0.06)
IMMATURE GRANULOCYTES %: 1.2 % (ref 0–2)
LYMPHOCYTES ABSOLUTE: 1.7 K/UL (ref 0.9–5.2)
LYMPHOCYTES RELATIVE PERCENT: 39.4 % (ref 20–45)
MCH RBC QN AUTO: 30.2 PG (ref 26–32)
MCHC RBC AUTO-ENTMCNC: 32.7 G/DL (ref 32–35)
MCV RBC AUTO: 93 FL (ref 75–100)
MONOCYTES ABSOLUTE: 0.58 K/UL (ref 0.1–1)
MONOCYTES RELATIVE PERCENT: 13.5 % (ref 0–13)
NEUTROPHILS ABSOLUTE: 1.78 K/UL (ref 1.9–8)
NEUTROPHILS RELATIVE PERCENT: 41.3 % (ref 45–75)
PDW BLD-RTO: 12.9 % (ref 11.2–14.8)
PLATELET # BLD: 235 THOUS/CMM (ref 140–440)
POTASSIUM SERPL-SCNC: 4.7 MMOL/L (ref 3.5–5.4)
RBC # BLD: 4.53 MILL/CMM (ref 4.4–6.1)
SODIUM BLD-SCNC: 137 MMOL/L (ref 135–148)
TOTAL PROTEIN: 6.7 G/DL (ref 6–8.3)
WBC # BLD: 4.3 THDS/CMM (ref 3.6–11)

## 2025-03-23 DIAGNOSIS — H53.2 DOUBLE VISION: ICD-10-CM

## 2025-03-23 DIAGNOSIS — G70.00 OCULAR MYASTHENIA (HCC): ICD-10-CM

## 2025-03-24 RX ORDER — MYCOPHENOLATE MOFETIL 500 MG/1
500 TABLET ORAL 2 TIMES DAILY
Qty: 180 TABLET | Refills: 3 | OUTPATIENT
Start: 2025-03-24

## 2025-03-31 ENCOUNTER — OFFICE VISIT (OUTPATIENT)
Dept: NEUROLOGY | Age: 75
End: 2025-03-31
Payer: MEDICARE

## 2025-03-31 VITALS
HEART RATE: 65 BPM | OXYGEN SATURATION: 98 % | DIASTOLIC BLOOD PRESSURE: 65 MMHG | SYSTOLIC BLOOD PRESSURE: 120 MMHG | BODY MASS INDEX: 33.32 KG/M2 | WEIGHT: 238 LBS | HEIGHT: 71 IN

## 2025-03-31 DIAGNOSIS — H53.2 DOUBLE VISION: ICD-10-CM

## 2025-03-31 DIAGNOSIS — G70.00 OCULAR MYASTHENIA (HCC): Primary | ICD-10-CM

## 2025-03-31 PROCEDURE — G8417 CALC BMI ABV UP PARAM F/U: HCPCS | Performed by: NURSE PRACTITIONER

## 2025-03-31 PROCEDURE — 1159F MED LIST DOCD IN RCRD: CPT | Performed by: NURSE PRACTITIONER

## 2025-03-31 PROCEDURE — 3078F DIAST BP <80 MM HG: CPT | Performed by: NURSE PRACTITIONER

## 2025-03-31 PROCEDURE — 1123F ACP DISCUSS/DSCN MKR DOCD: CPT | Performed by: NURSE PRACTITIONER

## 2025-03-31 PROCEDURE — 99213 OFFICE O/P EST LOW 20 MIN: CPT | Performed by: NURSE PRACTITIONER

## 2025-03-31 PROCEDURE — 1036F TOBACCO NON-USER: CPT | Performed by: NURSE PRACTITIONER

## 2025-03-31 PROCEDURE — 3017F COLORECTAL CA SCREEN DOC REV: CPT | Performed by: NURSE PRACTITIONER

## 2025-03-31 PROCEDURE — G8427 DOCREV CUR MEDS BY ELIG CLIN: HCPCS | Performed by: NURSE PRACTITIONER

## 2025-03-31 PROCEDURE — 3074F SYST BP LT 130 MM HG: CPT | Performed by: NURSE PRACTITIONER

## 2025-03-31 RX ORDER — MYCOPHENOLATE MOFETIL 500 MG/1
500 TABLET ORAL 2 TIMES DAILY
Qty: 180 TABLET | Refills: 3 | Status: SHIPPED | OUTPATIENT
Start: 2025-03-31

## 2025-03-31 NOTE — PATIENT INSTRUCTIONS
Continue with  Cellcept 500 mg two times a day. Refills given  Continue with Mestinon at current dose.  CBC and CMP ordered to be done 9/2025  Follow up in 6 months or sooner if needed.  Call if any questions or concerns.

## 2025-03-31 NOTE — PROGRESS NOTES
NEUROLOGY OUT PATIENT FOLLOW UP NOTE:  3/31/584300:55 AM    Jose Sherman is here for follow up for occular myasthenia gravis, double vision       Allergies   Allergen Reactions    Latex        Current Outpatient Medications:     pyRIDostigmine (MESTINON) 30 MG tablet, TAKE 1 TABLET THREE TIMES DAILY, Disp: 270 tablet, Rfl: 3    metoprolol succinate (TOPROL XL) 50 MG extended release tablet, , Disp: , Rfl:     mycophenolate (CELLCEPT) 500 MG tablet, TAKE 1 TABLET TWICE DAILY, Disp: 180 tablet, Rfl: 3    RYBELSUS 7 MG TABS, TAKE ONE TABLET BY MOUTH ONCE EVERY DAY, Disp: , Rfl:     aspirin 81 MG EC tablet, Take 1 tablet by mouth daily, Disp: , Rfl:     Misc Natural Products (GLUCOSAMINE CHOND CMP ADVANCED PO), Take by mouth, Disp: , Rfl:     levocetirizine (XYZAL) 5 MG tablet, Take 1 tablet by mouth nightly, Disp: , Rfl:     LANTUS SOLOSTAR 100 UNIT/ML injection pen, Inject 25 Units into the skin daily 18-20, Disp: , Rfl:     Turmeric 400 MG CAPS, Take by mouth, Disp: , Rfl:     traZODone (DESYREL) 150 MG tablet, Take 1 tablet by mouth nightly 0.5 tablet nightly, Disp: , Rfl:     ipratropium (ATROVENT) 0.03 % nasal spray, , Disp: , Rfl:     metFORMIN (GLUCOPHAGE) 1000 MG tablet, Take 1 tablet by mouth 2 times daily (with meals), Disp: , Rfl:     amLODIPine (NORVASC) 2.5 MG tablet, Take 1 tablet by mouth daily, Disp: , Rfl:     busPIRone (BUSPAR) 15 MG tablet, Take 15 mg by mouth 2 times daily, Disp: , Rfl:     dapagliflozin (FARXIGA) 10 MG tablet, Take 1 tablet by mouth every morning, Disp: , Rfl:     Lutein-Zeaxanthin 25-5 MG CAPS, Take by mouth daily , Disp: , Rfl:     Multiple Vitamins-Minerals (CENTRUM SILVER ADULT 50+ PO), Take by mouth Senior Multi-Vitamin, Disp: , Rfl:     clopidogrel (PLAVIX) 75 MG tablet, Take 1 tablet by mouth daily, Disp: , Rfl:     enalapril (VASOTEC) 5 MG tablet, Take 1 tablet by mouth daily, Disp: , Rfl:     rosuvastatin (CRESTOR) 20 MG tablet, Take 1 tablet by mouth daily, Disp: ,